# Patient Record
Sex: MALE
[De-identification: names, ages, dates, MRNs, and addresses within clinical notes are randomized per-mention and may not be internally consistent; named-entity substitution may affect disease eponyms.]

---

## 2020-06-06 ENCOUNTER — HOSPITAL ENCOUNTER (INPATIENT)
Dept: HOSPITAL 5 - ED | Age: 35
LOS: 7 days | Discharge: HOME HEALTH | DRG: 871 | End: 2020-06-13
Attending: INTERNAL MEDICINE | Admitting: INTERNAL MEDICINE
Payer: SELF-PAY

## 2020-06-06 DIAGNOSIS — J18.9: ICD-10-CM

## 2020-06-06 DIAGNOSIS — Z79.899: ICD-10-CM

## 2020-06-06 DIAGNOSIS — E83.51: ICD-10-CM

## 2020-06-06 DIAGNOSIS — K61.1: ICD-10-CM

## 2020-06-06 DIAGNOSIS — E43: ICD-10-CM

## 2020-06-06 DIAGNOSIS — F17.200: ICD-10-CM

## 2020-06-06 DIAGNOSIS — Z88.2: ICD-10-CM

## 2020-06-06 DIAGNOSIS — A41.9: Primary | ICD-10-CM

## 2020-06-06 DIAGNOSIS — N40.0: ICD-10-CM

## 2020-06-06 DIAGNOSIS — K61.0: ICD-10-CM

## 2020-06-06 DIAGNOSIS — Z20.828: ICD-10-CM

## 2020-06-06 DIAGNOSIS — Z79.4: ICD-10-CM

## 2020-06-06 DIAGNOSIS — E11.9: ICD-10-CM

## 2020-06-06 LAB
ALBUMIN SERPL-MCNC: 2.1 G/DL (ref 3.9–5)
ALT SERPL-CCNC: 9 UNITS/L (ref 7–56)
BAND NEUTROPHILS # (MANUAL): 0 K/MM3
BILIRUB UR QL STRIP: (no result)
BLOOD UR QL VISUAL: (no result)
BUN SERPL-MCNC: 6 MG/DL (ref 9–20)
BUN/CREAT SERPL: 10 %
CALCIUM SERPL-MCNC: 7.9 MG/DL (ref 8.4–10.2)
CRP SERPL-MCNC: 7.2 MG/DL (ref 0–1.3)
HCT VFR BLD CALC: 28 % (ref 35.5–45.6)
HEMOLYSIS INDEX: 15
HGB BLD-MCNC: 9.1 GM/DL (ref 11.8–15.2)
MCHC RBC AUTO-ENTMCNC: 33 % (ref 32–34)
MCV RBC AUTO: 89 FL (ref 84–94)
MYELOCYTES # (MANUAL): 0 K/MM3
PH UR STRIP: 8 [PH] (ref 5–7)
PLATELET # BLD: 227 K/MM3 (ref 140–440)
PROMYELOCYTES # (MANUAL): 0 K/MM3
PROT UR STRIP-MCNC: (no result) MG/DL
RBC # BLD AUTO: 3.16 M/MM3 (ref 3.65–5.03)
RBC #/AREA URNS HPF: 2 /HPF (ref 0–6)
TOTAL CELLS COUNTED BLD: 100
UROBILINOGEN UR-MCNC: < 2 MG/DL (ref ?–2)
WBC #/AREA URNS HPF: 1 /HPF (ref 0–6)

## 2020-06-06 PROCEDURE — 74177 CT ABD & PELVIS W/CONTRAST: CPT

## 2020-06-06 PROCEDURE — 87641 MR-STAPH DNA AMP PROBE: CPT

## 2020-06-06 PROCEDURE — 88305 TISSUE EXAM BY PATHOLOGIST: CPT

## 2020-06-06 PROCEDURE — 71045 X-RAY EXAM CHEST 1 VIEW: CPT

## 2020-06-06 PROCEDURE — 99406 BEHAV CHNG SMOKING 3-10 MIN: CPT

## 2020-06-06 PROCEDURE — 82140 ASSAY OF AMMONIA: CPT

## 2020-06-06 PROCEDURE — 82728 ASSAY OF FERRITIN: CPT

## 2020-06-06 PROCEDURE — 87040 BLOOD CULTURE FOR BACTERIA: CPT

## 2020-06-06 PROCEDURE — 81001 URINALYSIS AUTO W/SCOPE: CPT

## 2020-06-06 PROCEDURE — A4217 STERILE WATER/SALINE, 500 ML: HCPCS

## 2020-06-06 PROCEDURE — 82962 GLUCOSE BLOOD TEST: CPT

## 2020-06-06 PROCEDURE — 84145 PROCALCITONIN (PCT): CPT

## 2020-06-06 PROCEDURE — 36415 COLL VENOUS BLD VENIPUNCTURE: CPT

## 2020-06-06 PROCEDURE — 80053 COMPREHEN METABOLIC PANEL: CPT

## 2020-06-06 PROCEDURE — 83615 LACTATE (LD) (LDH) ENZYME: CPT

## 2020-06-06 PROCEDURE — 85379 FIBRIN DEGRADATION QUANT: CPT

## 2020-06-06 PROCEDURE — 85025 COMPLETE CBC W/AUTO DIFF WBC: CPT

## 2020-06-06 PROCEDURE — 82947 ASSAY GLUCOSE BLOOD QUANT: CPT

## 2020-06-06 PROCEDURE — 85007 BL SMEAR W/DIFF WBC COUNT: CPT

## 2020-06-06 PROCEDURE — 86140 C-REACTIVE PROTEIN: CPT

## 2020-06-06 PROCEDURE — A6250 SKIN SEAL PROTECT MOISTURIZR: HCPCS

## 2020-06-06 NOTE — HISTORY AND PHYSICAL REPORT
History of Present Illness


Date of examination: 06/06/20


Date of admission: 


06/06/20 18:42





Chief complaint: 





Fever 1 day 


History of present illness: 


Patient is 34 years old male with no significant past medical history except for

his recent admission to the hospital for perirectal abscess.  Patient was 

discharged from the hospital yesterday with a PICC line and an order for 

meropenem, Augmentin and doxycycline.  Patient brought to the emergency room 

from home for evaluation of fever and pain to the local area.  Patient had an 

I&D done by Dr. Chavez on June 1.  Patient denies any cough, shortness of 

breath, runny nose congestion.  Patient also denied any urinary symptoms.  

Sepsis protocol initiated and patient received a dose of meropenem, normal 

saline and, Tylenol and morphine.


Work up in ED revealed L medial pneumonia


Patient on appropriate abx.








- Past Medical History


BPH


IDDM








- Surgical History


Perirectal abscess --I  and D on June 1 2020





- Social History   


Smoking Status: Current Every Day Smoker





- 


Diabetes








- Medications


Home Medications: 


                                Home Medications











 Medication  Instructions  Recorded  Confirmed  Last Taken  Type


 


Balsalazide Disodium [Colazal] 750 mg PO TID 06/01/20 06/06/20 Unknown History


 


Insulin Detemir [Levemir VIAL] 25 unit SQ QHS 06/01/20 06/06/20 Unknown History


 


Insulin NPH/Regular [NovoLIN 70/30] 4 unit SUB-Q BID 06/01/20 06/06/20 Unknown 

History


 


OLANZapine [Zyprexa] 5 mg PO DAILY 06/01/20 06/06/20 Unknown History


 


Potassium Chloride [K-Dur] 10 meq PO BID 06/01/20 06/06/20 Unknown History


 


Tamsulosin [Flomax] 0.4 mg PO QDAY 06/01/20 06/06/20 Unknown History


 


ursodioL [Ursodiol] 300 mg PO BID 06/01/20 06/06/20 Unknown History


 


Amoxicillin/K Clav Tab [Augmentin 1 tab PO Q12HR #28 tab 06/02/20 06/06/20 

Unknown Rx





875 mg]     


 


Doxycycline Hyclate [Doxycycline 100 mg PO Q12HR #28 tab 06/02/20 06/06/20 

Unknown Rx





Hyclate TAB]     


 


Ibuprofen [Motrin 800 MG tab] 800 mg PO Q8HR PRN #20 tablet 06/02/20 06/06/20 

Unknown Rx


 


Acetaminophen [Acetaminophen TAB] 650 mg PO Q4H PRN  tablet 06/05/20 06/06/20 

Unknown Rx














 Review of Systems 


ROS: 


Stated complaint: FEVER


Other details as noted in HPI


Comment: All other systems reviewed and negative


Constitutional: chills, fever


Respiratory: denies: cough, orthopnea, shortness of breath, SOB with exertion, 

SOB at rest, wheezing


Cardiovascular: palpitations.  denies: chest pain


Gastrointestinal: denies: abdominal pain, nausea, vomiting


Neurological: denies: headache, weakness














Medications and Allergies


                                    Allergies











Allergy/AdvReac Type Severity Reaction Status Date / Time


 


Sulfa (Sulfonamide Allergy  Rash Verified 05/31/20 19:19





Antibiotics)     











                                Home Medications











 Medication  Instructions  Recorded  Confirmed  Last Taken  Type


 


Balsalazide Disodium [Colazal] 750 mg PO TID 06/01/20 06/06/20 Unknown History


 


Insulin Detemir [Levemir VIAL] 25 unit SQ QHS 06/01/20 06/06/20 Unknown History


 


Insulin NPH/Regular [NovoLIN 70/30] 4 unit SUB-Q BID 06/01/20 06/06/20 Unknown 

History


 


OLANZapine [Zyprexa] 5 mg PO DAILY 06/01/20 06/06/20 Unknown History


 


Potassium Chloride [K-Dur] 10 meq PO BID 06/01/20 06/06/20 Unknown History


 


Tamsulosin [Flomax] 0.4 mg PO QDAY 06/01/20 06/06/20 Unknown History


 


ursodioL [Ursodiol] 300 mg PO BID 06/01/20 06/06/20 Unknown History


 


Amoxicillin/K Clav Tab [Augmentin 1 tab PO Q12HR #28 tab 06/02/20 06/06/20 

Unknown Rx





875 mg]     


 


Doxycycline Hyclate [Doxycycline 100 mg PO Q12HR #28 tab 06/02/20 06/06/20 

Unknown Rx





Hyclate TAB]     


 


Ibuprofen [Motrin 800 MG tab] 800 mg PO Q8HR PRN #20 tablet 06/02/20 06/06/20 

Unknown Rx


 


Acetaminophen [Acetaminophen TAB] 650 mg PO Q4H PRN  tablet 06/05/20 06/06/20 

Unknown Rx














Exam





- Constitutional


Vitals: 


                                        











Temp Pulse Resp BP Pulse Ox


 


 98.2 F   66   18   86/51   95 


 


 06/06/20 21:45  06/06/20 21:45  06/06/20 21:45  06/06/20 21:45  06/06/20 21:45











General appearance: Present: no acute distress, well-nourished





- EENT


Eyes: Present: PERRL


ENT: hearing intact, clear oral mucosa





- Neck


Neck: Present: supple, normal ROM





- Respiratory


Respiratory effort: normal


Respiratory: bilateral: CTA





- Cardiovascular


Heart rate: 78


Rhythm: regular


Heart Sounds: Present: S1 & S2.  Absent: rub, click





- Extremities


Extremities: no ischemia, pulses intact, pulses symmetrical, No edema


Peripheral Pulses: within normal limits





- Abdominal


General gastrointestinal: Present: soft, non-tender, non-distended, normal bowel

sounds


Male genitourinary: Present: normal





- Rectal


Rectal Exam: deferred





- Integumentary


Integumentary: Present: clear, warm, dry





- Musculoskeletal


Musculoskeletal: gait normal, strength equal bilaterally





- Psychiatric


Psychiatric: appropriate mood/affect, intact judgment & insight





- Neurologic


Neurologic: CNII-XII intact, moves all extremities





- Allied Health


Allied health notes reviewed: nursing, case management





Results





- Labs


CBC & Chem 7: 


                                 06/06/20 17:58





                                 06/06/20 18:58


Labs: 


                             Laboratory Last Values











WBC  4.7 K/mm3 (4.5-11.0)   06/06/20  17:58    


 


RBC  3.16 M/mm3 (3.65-5.03)  L  06/06/20  17:58    


 


Hgb  9.1 gm/dl (11.8-15.2)  L  06/06/20  17:58    


 


Hct  28.0 % (35.5-45.6)  L  06/06/20  17:58    


 


MCV  89 fl (84-94)   06/06/20  17:58    


 


MCH  29 pg (28-32)   06/06/20  17:58    


 


MCHC  33 % (32-34)   06/06/20  17:58    


 


RDW  17.7 % (13.2-15.2)  H  06/06/20  17:58    


 


Plt Count  227 K/mm3 (140-440)   06/06/20  17:58    


 


Mono % (Auto)  Np   06/06/20  17:58    


 


Add Manual Diff  Complete   06/06/20  17:58    


 


Total Counted  100   06/06/20  17:58    


 


Seg Neuts % (Manual)  67.0 % (40.0-70.0)   06/06/20  17:58    


 


Band Neutrophils %  0 %  06/06/20  17:58    


 


Lymphocytes % (Manual)  21.0 % (13.4-35.0)   06/06/20  17:58    


 


Reactive Lymphs % (Man)  0 %  06/06/20  17:58    


 


Monocytes % (Manual)  11.0 % (0.0-7.3)  H  06/06/20  17:58    


 


Eosinophils % (Manual)  1.0 % (0.0-4.3)   06/06/20  17:58    


 


Basophils % (Manual)  0 % (0.0-1.8)   06/06/20  17:58    


 


Metamyelocytes %  0 %  06/06/20  17:58    


 


Myelocytes %  0 %  06/06/20  17:58    


 


Promyelocytes %  0 %  06/06/20  17:58    


 


Blast Cells %  0 %  06/06/20  17:58    


 


Nucleated RBC %  Not Reportable   06/06/20  17:58    


 


Seg Neutrophils # Man  3.1 K/mm3 (1.8-7.7)   06/06/20  17:58    


 


Band Neutrophils #  0.0 K/mm3  06/06/20  17:58    


 


Lymphocytes # (Manual)  1.0 K/mm3 (1.2-5.4)  L  06/06/20  17:58    


 


Abs React Lymphs (Man)  0.0 K/mm3  06/06/20  17:58    


 


Monocytes # (Manual)  0.5 K/mm3 (0.0-0.8)   06/06/20  17:58    


 


Eosinophils # (Manual)  0.0 K/mm3 (0.0-0.4)   06/06/20  17:58    


 


Basophils # (Manual)  0.0 K/mm3 (0.0-0.1)   06/06/20  17:58    


 


Metamyelocytes #  0.0 K/mm3  06/06/20  17:58    


 


Myelocytes #  0.0 K/mm3  06/06/20  17:58    


 


Promyelocytes #  0.0 K/mm3  06/06/20  17:58    


 


Blast Cells #  0.0 K/mm3  06/06/20  17:58    


 


WBC Morphology  Not Reportable   06/06/20  17:58    


 


Hypersegmented Neuts  Not Reportable   06/06/20  17:58    


 


Hyposegmented Neuts  Not Reportable   06/06/20  17:58    


 


Hypogranular Neuts  Not Reportable   06/06/20  17:58    


 


Smudge Cells  Not Reportable   06/06/20  17:58    


 


Toxic Granulation  Not Reportable   06/06/20  17:58    


 


Toxic Vacuolation  Not Reportable   06/06/20  17:58    


 


Dohle Bodies  Not Reportable   06/06/20  17:58    


 


Pelger-Huet Anomaly  Not Reportable   06/06/20  17:58    


 


Tammy Rods  Not Reportable   06/06/20  17:58    


 


Platelet Estimate  Consistent w auto   06/06/20  17:58    


 


Clumped Platelets  Not Reportable   06/06/20  17:58    


 


Plt Clumps, EDTA  Not Reportable   06/06/20  17:58    


 


Large Platelets  Not Reportable   06/06/20  17:58    


 


Giant Platelets  Not Reportable   06/06/20  17:58    


 


Platelet Satelliting  Not Reportable   06/06/20  17:58    


 


Plt Morphology Comment  Not Reportable   06/06/20  17:58    


 


RBC Morphology  Not Reportable   06/06/20  17:58    


 


Dimorphic RBCs  Not Reportable   06/06/20  17:58    


 


Polychromasia  Few   06/06/20  17:58    


 


Hypochromasia  Not Reportable   06/06/20  17:58    


 


Poikilocytosis  Not Reportable   06/06/20  17:58    


 


Anisocytosis  Not Reportable   06/06/20  17:58    


 


Microcytosis  1+   06/06/20  17:58    


 


Macrocytosis  Not Reportable   06/06/20  17:58    


 


Spherocytes  Not Reportable   06/06/20  17:58    


 


Pappenheimer Bodies  Not Reportable   06/06/20  17:58    


 


Sickle Cells  Not Reportable   06/06/20  17:58    


 


Target Cells  Not Reportable   06/06/20  17:58    


 


Tear Drop Cells  Not Reportable   06/06/20  17:58    


 


Ovalocytes  Not Reportable   06/06/20  17:58    


 


Stomatocytes  Few   06/06/20  17:58    


 


Helmet Cells  Not Reportable   06/06/20  17:58    


 


[place] Bodies  Not Reportable   06/06/20  17:58    


 


Cabot Rings  Not Reportable   06/06/20  17:58    


 


[place] Cells  Not Reportable   06/06/20  17:58    


 


Bite Cells  Not Reportable   06/06/20  17:58    


 


Crenated Cell  Not Reportable   06/06/20  17:58    


 


Elliptocytes  Not Reportable   06/06/20  17:58    


 


Acanthocytes (Spur)  Not Reportable   06/06/20  17:58    


 


Rouleaux  Not Reportable   06/06/20  17:58    


 


Hemoglobin C Crystals  Not Reportable   06/06/20  17:58    


 


Schistocytes  Not Reportable   06/06/20  17:58    


 


Malaria parasites  Not Reportable   06/06/20  17:58    


 


[place] Bodies  Not Reportable   06/06/20  17:58    


 


Hem Pathologist Commnt  No   06/06/20  17:58    


 


D-Dimer  597.75 ng/mlDDU (0-234)  H  06/06/20  18:58    


 


Sodium  139 mmol/L (137-145)   06/06/20  17:58    


 


Potassium  3.7 mmol/L (3.6-5.0)   06/06/20  17:58    


 


Chloride  102.0 mmol/L ()   06/06/20  17:58    


 


Carbon Dioxide  25 mmol/L (22-30)   06/06/20  17:58    


 


Anion Gap  16 mmol/L  06/06/20  17:58    


 


BUN  6 mg/dL (9-20)  L  06/06/20  17:58    


 


Creatinine  0.6 mg/dL (0.8-1.5)  L  06/06/20  17:58    


 


Estimated GFR  > 60 ml/min  06/06/20  17:58    


 


BUN/Creatinine Ratio  10 %  06/06/20  17:58    


 


Glucose  134 mg/dL ()  H  06/06/20  18:58    


 


Lactic Acid  1.10 mmol/L (0.7-2.0)   06/06/20  17:58    


 


Calcium  7.9 mg/dL (8.4-10.2)  L  06/06/20  17:58    


 


Ferritin  80.4 ng/mL (13.0-400.0)   06/06/20  18:58    


 


Total Bilirubin  0.30 mg/dL (0.1-1.2)   06/06/20  17:58    


 


AST  10 units/L (5-40)   06/06/20  17:58    


 


ALT  9 units/L (7-56)   06/06/20  17:58    


 


Alkaline Phosphatase  323 units/L ()  H  06/06/20  17:58    


 


Lactate Dehydrogenase  155 units/L ()   06/06/20  18:58    


 


C-Reactive Protein  7.20 mg/dL (0.00-1.30)  H  06/06/20  18:58    


 


Total Protein  5.9 g/dL (6.3-8.2)  L  06/06/20  17:58    


 


Albumin  2.1 g/dL (3.9-5)  L  06/06/20  17:58    


 


Albumin/Globulin Ratio  0.6 %  06/06/20  17:58    


 


Urine Color  Straw  (Yellow)   06/06/20  17:31    


 


Urine Turbidity  Clear  (Clear)   06/06/20  17:31    


 


Urine pH  8.0  (5.0-7.0)  H  06/06/20  17:31    


 


Ur Specific Gravity  1.006  (1.003-1.030)   06/06/20  17:31    


 


Urine Protein  <15 mg/dl mg/dL (Negative)   06/06/20  17:31    


 


Urine Glucose (UA)  >=500 mg/dL (Negative)   06/06/20  17:31    


 


Urine Ketones  Neg mg/dL (Negative)   06/06/20  17:31    


 


Urine Blood  Neg  (Negative)   06/06/20  17:31    


 


Urine Nitrite  Neg  (Negative)   06/06/20  17:31    


 


Urine Bilirubin  Neg  (Negative)   06/06/20  17:31    


 


Urine Urobilinogen  < 2.0 mg/dL (<2.0)   06/06/20  17:31    


 


Ur Leukocyte Esterase  Neg  (Negative)   06/06/20  17:31    


 


Urine WBC (Auto)  1.0 /HPF (0.0-6.0)   06/06/20  17:31    


 


Urine RBC (Auto)  2.0 /HPF (0.0-6.0)   06/06/20  17:31    








                                    Short CBC











  06/06/20 Range/Units





  17:58 


 


WBC  4.7  (4.5-11.0)  K/mm3


 


Hgb  9.1 L  (11.8-15.2)  gm/dl


 


Hct  28.0 L  (35.5-45.6)  %


 


Plt Count  227  (140-440)  K/mm3








                                       BMP











  06/06/20 06/06/20





  17:58 18:58


 


Sodium  139 


 


Potassium  3.7 


 


Chloride  102.0 


 


Carbon Dioxide  25 


 


BUN  6 L 


 


Creatinine  0.6 L 


 


Glucose  156 H  134 H


 


Calcium  7.9 L 








                                 Liver Function











  06/06/20 Range/Units





  17:58 


 


Total Bilirubin  0.30  (0.1-1.2)  mg/dL


 


AST  10  (5-40)  units/L


 


ALT  9  (7-56)  units/L


 


Alkaline Phosphatase  323 H  ()  units/L


 


Albumin  2.1 L  (3.9-5)  g/dL








                                      Urine











  06/06/20 Range/Units





  17:31 


 


Urine Color  Straw  (Yellow)  


 


Urine pH  8.0 H  (5.0-7.0)  


 


Ur Specific Gravity  1.006  (1.003-1.030)  


 


Urine Protein  <15 mg/dl  (Negative)  mg/dL


 


Urine Glucose (UA)  >=500  (Negative)  mg/dL











Microbiology: 


Microbiology





06/06/20 17:58   Peripheral/Venous   Blood Culture - Preliminary


                            Culture in Progress


06/06/20 17:58   Peripheral/Venous   Blood Culture - Preliminary


                            Culture in Progress











- Imaging and Cardiology


Chest x-ray: report reviewed (LLL pneumonia)





Assessment and Plan


Advance Directives: Yes (Full code)


VTE prophylaxis?: Chemical





- Patient Problems


(1) Left lower lobe pneumonia


Current Visit: Yes   Status: Acute   


Plan to address problem: 


Patient on appropriate abx


COnt Meropenem.


Reason for admitting is rule out COvid reated pna


If Corona virus negative --Patient maybe discharged on same abx


Isolation tillo then








(2) Perirectal abscess


Current Visit: Yes   Status: Acute   


Plan to address problem: 


Cont Meropenem and Augmentin








(3) IDDM (insulin dependent diabetes mellitus)


Current Visit: Yes   Status: Chronic   


Plan to address problem: 


Recent last A1c 7.1 a week ago


Cont Home Insulin and coverage








(4) BPH (benign prostatic hyperplasia)


Current Visit: Yes   Status: Chronic   


Qualifiers: 


   Lower urinary tract symptom presence: symptoms present 


Plan to address problem: 


Cont Flomax








(5) Malnutrition


Current Visit: Yes   Status: Acute   


Qualifiers: 


   Protein-calorie malnutrition severity: severe 


Plan to address problem: 


Albumin 2.1


Dietitian consult


Dietary supplements ordered








(6) Hypocalcemia


Current Visit: Yes   Status: Acute   


Plan to address problem: 


Supplemented








(7) DVT prophylaxis


Current Visit: No   Status: Acute   


Plan to address problem: 


On Lovenox and GI prophylaxis

## 2020-06-06 NOTE — EMERGENCY DEPARTMENT REPORT
ED General Adult HPI





- General


Chief complaint: Fever


Stated complaint: FEVER


Time Seen by Provider: 06/06/20 17:22


Source: patient


Mode of arrival: Stretcher


Limitations: Physical Limitation





- History of Present Illness


Initial comments: 





Patient is 34 years old male with no significant past medical history except for

his recent admission to the hospital for perirectal abscess.  Patient was 

discharged from the hospital yesterday with a PICC line and an order for 

meropenem, Augmentin and doxycycline.  Patient brought to the emergency room 

from home for evaluation of fever and pain to the local area.  Patient had an 

I&D done by Dr. Chavez on June 1.  Patient denies any cough, shortness of 

breath, runny nose congestion.  Patient also denied any urinary symptoms.  

Sepsis protocol initiated and patient received a dose of meropenem, normal 

saline and, Tylenol and morphine.


Severity scale (0 -10): 7





- Related Data


                                Home Medications











 Medication  Instructions  Recorded  Confirmed  Last Taken


 


Balsalazide Disodium [Colazal] 750 mg PO TID 06/01/20 06/06/20 Unknown


 


Insulin Detemir [Levemir VIAL] 25 unit SQ QHS 06/01/20 06/06/20 Unknown


 


Insulin NPH/Regular [NovoLIN 70/30] 4 unit SUB-Q BID 06/01/20 06/06/20 Unknown


 


OLANZapine [Zyprexa] 5 mg PO DAILY 06/01/20 06/06/20 Unknown


 


Potassium Chloride [K-Dur] 10 meq PO BID 06/01/20 06/06/20 Unknown


 


Tamsulosin [Flomax] 0.4 mg PO QDAY 06/01/20 06/06/20 Unknown


 


ursodioL [Ursodiol] 300 mg PO BID 06/01/20 06/06/20 Unknown








                                  Previous Rx's











 Medication  Instructions  Recorded  Last Taken  Type


 


Amoxicillin/K Clav Tab [Augmentin 1 tab PO Q12HR #28 tab 06/02/20 Unknown Rx





875 mg]    


 


Doxycycline Hyclate [Doxycycline 100 mg PO Q12HR #28 tab 06/02/20 Unknown Rx





Hyclate TAB]    


 


Ibuprofen [Motrin 800 MG tab] 800 mg PO Q8HR PRN #20 tablet 06/02/20 Unknown Rx


 


Acetaminophen [Acetaminophen TAB] 650 mg PO Q4H PRN  tablet 06/05/20 Unknown Rx











                                    Allergies











Allergy/AdvReac Type Severity Reaction Status Date / Time


 


Sulfa (Sulfonamide Allergy  Rash Verified 05/31/20 19:19





Antibiotics)     














ED Review of Systems


ROS: 


Stated complaint: FEVER


Other details as noted in HPI





Comment: All other systems reviewed and negative


Constitutional: chills, fever


Respiratory: denies: cough, orthopnea, shortness of breath, SOB with exertion, 

SOB at rest, wheezing


Cardiovascular: palpitations.  denies: chest pain


Gastrointestinal: denies: abdominal pain, nausea, vomiting


Neurological: denies: headache, weakness





ED Past Medical Hx





- Past Medical History


Hx Hypertension: No


Hx Heart Attack/AMI: No


Hx Congestive Heart Failure: No


Hx Diabetes: Yes


Hx Deep Vein Thrombosis: No


Hx Pulmonary Embolism: No


Hx Liver Disease: No


Hx Renal Disease: No


Hx Sickle Cell Disease: No


Hx Arthritis: No


Hx Seizures: No


Hx Asthma: No


Hx COPD: No


Hx HIV: No





- Surgical History


Hx Pacemaker: No


Hx Internal Defibrillator: No


Hx Cholecystectomy: No


Hx Appendectomy: No





- Social History


Smoking Status: Current Every Day Smoker





- Medications


Home Medications: 


                                Home Medications











 Medication  Instructions  Recorded  Confirmed  Last Taken  Type


 


Balsalazide Disodium [Colazal] 750 mg PO TID 06/01/20 06/06/20 Unknown History


 


Insulin Detemir [Levemir VIAL] 25 unit SQ QHS 06/01/20 06/06/20 Unknown History


 


Insulin NPH/Regular [NovoLIN 70/30] 4 unit SUB-Q BID 06/01/20 06/06/20 Unknown 

History


 


OLANZapine [Zyprexa] 5 mg PO DAILY 06/01/20 06/06/20 Unknown History


 


Potassium Chloride [K-Dur] 10 meq PO BID 06/01/20 06/06/20 Unknown History


 


Tamsulosin [Flomax] 0.4 mg PO QDAY 06/01/20 06/06/20 Unknown History


 


ursodioL [Ursodiol] 300 mg PO BID 06/01/20 06/06/20 Unknown History


 


Amoxicillin/K Clav Tab [Augmentin 1 tab PO Q12HR #28 tab 06/02/20 06/06/20 

Unknown Rx





875 mg]     


 


Doxycycline Hyclate [Doxycycline 100 mg PO Q12HR #28 tab 06/02/20 06/06/20 

Unknown Rx





Hyclate TAB]     


 


Ibuprofen [Motrin 800 MG tab] 800 mg PO Q8HR PRN #20 tablet 06/02/20 06/06/20 

Unknown Rx


 


Acetaminophen [Acetaminophen TAB] 650 mg PO Q4H PRN  tablet 06/05/20 06/06/20 

Unknown Rx














ED Physical Exam





- General


Limitations: Physical Limitation


General appearance: alert, in no apparent distress





- Head


Head exam: Present: atraumatic, normocephalic, normal inspection





- ENT


ENT exam: Present: mucous membranes dry





- Neck


Neck exam: Present: normal inspection, full ROM.  Absent: tenderness, 

meningismus





- Respiratory


Respiratory exam: Present: normal lung sounds bilaterally





- Cardiovascular


Cardiovascular Exam: Present: tachycardia





- GI/Abdominal


GI/Abdominal exam: Present: soft, normal bowel sounds.  Absent: distended, 

tenderness, guarding, rebound, rigid, organomegaly, mass, bruit, pulsatile mass,

hernia





- Rectal


Rectal exam: Present: other (I&D with a fistula track.  Stool coming through the

fistula.)





- Extremities Exam


Extremities exam: Present: normal inspection, full ROM, normal capillary refill





- Neurological Exam


Neurological exam: Present: alert, oriented X3, CN II-XII intact





- Skin


Skin exam: Present: warm





ED Course


                                   Vital Signs











  06/06/20 06/06/20 06/06/20





  17:27 18:15 18:31


 


Temperature 103.2 F H  


 


Pulse Rate 125 H 107 H 106 H


 


Respiratory 12 19 23





Rate   


 


Blood Pressure  112/69 112/69


 


Blood Pressure 127/83  





[Right]   


 


O2 Sat by Pulse 97 97 96





Oximetry   














  06/06/20 06/06/20 06/06/20





  19:01 19:15 19:31


 


Temperature   


 


Pulse Rate 110 H 98 H 102 H


 


Respiratory 18 14 13





Rate   


 


Blood Pressure 112/69 101/61 101/61


 


Blood Pressure   





[Right]   


 


O2 Sat by Pulse 96 97 95





Oximetry   














  06/06/20 06/06/20 06/06/20





  19:45 20:01 21:01


 


Temperature   


 


Pulse Rate 109 H 99 H 83


 


Respiratory 21 15 15





Rate   


 


Blood Pressure 101/61 101/61 96/56


 


Blood Pressure   





[Right]   


 


O2 Sat by Pulse 93 94 97





Oximetry   














  06/06/20 06/06/20 06/06/20





  21:14 21:21 21:45


 


Temperature 99.8 F H  98.2 F


 


Pulse Rate  76 66


 


Respiratory  16 18





Rate   


 


Blood Pressure  92/55 86/51


 


Blood Pressure   





[Right]   


 


O2 Sat by Pulse  97 95





Oximetry   














ED Medical Decision Making





- Lab Data


Result diagrams: 


                                 06/07/20 05:58





                                 06/07/20 05:58





- Radiology Data


Radiology results: report reviewed





- Medical Decision Making





Patient is 34 years old male with no significant past medical history except for

 his recent admission to the hospital for perirectal abscess.  Patient was 

discharged from the hospital yesterday with a PICC line and an order for 

meropenem, Augmentin and doxycycline.  Patient brought to the emergency room 

from home for evaluation of fever and pain to the local area.  Patient had an 

I&D done by Dr. Chavez on June 1.  Patient denies any cough, shortness of 

breath, runny nose congestion.  Patient also denied any urinary symptoms.  

Sepsis protocol initiated and patient received a dose of meropenem, normal 

saline and, Tylenol and morphine.





Chest x-ray showed a left lower lobe pneumonia.  Most likely hospital-acquired 

pneumonia.  Patient started on Levaquin.  I discussed the patient with Dr. Pacheco who is covering for Dr. Gómez, he agreed to admit the patient to the 

hospital for further management.


Critical Care Time: Yes


Critical care time in (mins) excluding proc time.: 30


Critical care attestation.: 


If time is entered above; I have spent that time in minutes in the direct care 

of this critically ill patient, excluding procedure time.








ED Disposition


Clinical Impression: 


 Perirectal abscess, Left lower lobe pneumonia, Fever, Sepsis





Disposition: [id number] OP ADMIT IP TO THIS HOSP


Is pt being admited?: Yes


Condition: Stable

## 2020-06-07 LAB
ALBUMIN SERPL-MCNC: 2.3 G/DL (ref 3.9–5)
ALT SERPL-CCNC: 8 UNITS/L (ref 7–56)
BASOPHILS # (AUTO): 0 K/MM3 (ref 0–0.1)
BASOPHILS NFR BLD AUTO: 0.2 % (ref 0–1.8)
BUN SERPL-MCNC: 5 MG/DL (ref 9–20)
BUN/CREAT SERPL: 7 %
CALCIUM SERPL-MCNC: 8.1 MG/DL (ref 8.4–10.2)
EOSINOPHIL # BLD AUTO: 0 K/MM3 (ref 0–0.4)
EOSINOPHIL NFR BLD AUTO: 0.6 % (ref 0–4.3)
HCT VFR BLD CALC: 29.2 % (ref 35.5–45.6)
HEMOLYSIS INDEX: 3
HGB BLD-MCNC: 9.4 GM/DL (ref 11.8–15.2)
LYMPHOCYTES # BLD AUTO: 1.4 K/MM3 (ref 1.2–5.4)
LYMPHOCYTES NFR BLD AUTO: 28 % (ref 13.4–35)
MCHC RBC AUTO-ENTMCNC: 32 % (ref 32–34)
MCV RBC AUTO: 89 FL (ref 84–94)
MONOCYTES # (AUTO): 0.8 K/MM3 (ref 0–0.8)
MONOCYTES % (AUTO): 15.7 % (ref 0–7.3)
PLATELET # BLD: 257 K/MM3 (ref 140–440)
RBC # BLD AUTO: 3.28 M/MM3 (ref 3.65–5.03)

## 2020-06-07 RX ADMIN — TAMSULOSIN HYDROCHLORIDE SCH MG: 0.4 CAPSULE ORAL at 09:47

## 2020-06-07 RX ADMIN — AMOXICILLIN AND CLAVULANATE POTASSIUM SCH EACH: 875; 125 TABLET, FILM COATED ORAL at 09:47

## 2020-06-07 RX ADMIN — HYDROMORPHONE HYDROCHLORIDE PRN MG: 1 INJECTION, SOLUTION INTRAMUSCULAR; INTRAVENOUS; SUBCUTANEOUS at 20:50

## 2020-06-07 RX ADMIN — DOXYCYCLINE HYCLATE SCH MG: 100 CAPSULE ORAL at 09:47

## 2020-06-07 RX ADMIN — Medication SCH ML: at 22:17

## 2020-06-07 RX ADMIN — INSULIN HUMAN SCH UNIT: 100 INJECTION, SUSPENSION SUBCUTANEOUS at 00:40

## 2020-06-07 RX ADMIN — INSULIN HUMAN SCH: 100 INJECTION, SUSPENSION SUBCUTANEOUS at 08:32

## 2020-06-07 RX ADMIN — AMOXICILLIN AND CLAVULANATE POTASSIUM SCH EACH: 875; 125 TABLET, FILM COATED ORAL at 00:40

## 2020-06-07 RX ADMIN — POTASSIUM CHLORIDE SCH MEQ: 1500 TABLET, EXTENDED RELEASE ORAL at 00:41

## 2020-06-07 RX ADMIN — MEROPENEM SCH MLS/HR: 500 INJECTION, POWDER, FOR SOLUTION INTRAVENOUS at 12:23

## 2020-06-07 RX ADMIN — DOXYCYCLINE HYCLATE SCH MG: 100 CAPSULE ORAL at 22:09

## 2020-06-07 RX ADMIN — OXYCODONE AND ACETAMINOPHEN PRN TAB: 5; 325 TABLET ORAL at 12:23

## 2020-06-07 RX ADMIN — Medication SCH EACH: at 22:09

## 2020-06-07 RX ADMIN — DOXYCYCLINE HYCLATE SCH MG: 100 CAPSULE ORAL at 00:41

## 2020-06-07 RX ADMIN — MEROPENEM SCH MLS/HR: 500 INJECTION, POWDER, FOR SOLUTION INTRAVENOUS at 05:50

## 2020-06-07 RX ADMIN — OXYCODONE AND ACETAMINOPHEN PRN TAB: 5; 325 TABLET ORAL at 01:01

## 2020-06-07 RX ADMIN — INSULIN HUMAN SCH UNIT: 100 INJECTION, SUSPENSION SUBCUTANEOUS at 12:23

## 2020-06-07 RX ADMIN — POTASSIUM CHLORIDE SCH MEQ: 1500 TABLET, EXTENDED RELEASE ORAL at 22:09

## 2020-06-07 RX ADMIN — INSULIN HUMAN SCH UNIT: 100 INJECTION, SUSPENSION SUBCUTANEOUS at 17:29

## 2020-06-07 RX ADMIN — FAMOTIDINE SCH MG: 20 TABLET ORAL at 22:09

## 2020-06-07 RX ADMIN — POTASSIUM CHLORIDE SCH MEQ: 1500 TABLET, EXTENDED RELEASE ORAL at 09:47

## 2020-06-07 RX ADMIN — HYDROMORPHONE HYDROCHLORIDE PRN MG: 1 INJECTION, SOLUTION INTRAMUSCULAR; INTRAVENOUS; SUBCUTANEOUS at 15:56

## 2020-06-07 RX ADMIN — HYDROMORPHONE HYDROCHLORIDE PRN MG: 1 INJECTION, SOLUTION INTRAMUSCULAR; INTRAVENOUS; SUBCUTANEOUS at 09:53

## 2020-06-07 RX ADMIN — MEROPENEM SCH MLS/HR: 500 INJECTION, POWDER, FOR SOLUTION INTRAVENOUS at 00:39

## 2020-06-07 RX ADMIN — AMOXICILLIN AND CLAVULANATE POTASSIUM SCH EACH: 875; 125 TABLET, FILM COATED ORAL at 22:09

## 2020-06-07 RX ADMIN — Medication SCH ML: at 09:46

## 2020-06-07 RX ADMIN — SODIUM CHLORIDE SCH MLS/HR: 0.9 INJECTION, SOLUTION INTRAVENOUS at 00:39

## 2020-06-07 RX ADMIN — IBUPROFEN PRN MG: 800 TABLET, FILM COATED ORAL at 05:50

## 2020-06-07 RX ADMIN — SODIUM CHLORIDE SCH MLS/HR: 0.9 INJECTION, SOLUTION INTRAVENOUS at 09:46

## 2020-06-07 RX ADMIN — Medication SCH EACH: at 09:47

## 2020-06-07 RX ADMIN — MEROPENEM SCH MLS/HR: 500 INJECTION, POWDER, FOR SOLUTION INTRAVENOUS at 17:27

## 2020-06-08 RX ADMIN — Medication SCH: at 09:09

## 2020-06-08 RX ADMIN — OXYCODONE AND ACETAMINOPHEN PRN TAB: 5; 325 TABLET ORAL at 08:08

## 2020-06-08 RX ADMIN — IBUPROFEN PRN MG: 800 TABLET, FILM COATED ORAL at 14:17

## 2020-06-08 RX ADMIN — Medication SCH EACH: at 22:47

## 2020-06-08 RX ADMIN — POTASSIUM CHLORIDE SCH MEQ: 1500 TABLET, EXTENDED RELEASE ORAL at 09:08

## 2020-06-08 RX ADMIN — OXYCODONE AND ACETAMINOPHEN PRN TAB: 5; 325 TABLET ORAL at 17:35

## 2020-06-08 RX ADMIN — OXYCODONE AND ACETAMINOPHEN PRN TAB: 5; 325 TABLET ORAL at 22:46

## 2020-06-08 RX ADMIN — INSULIN HUMAN SCH UNIT: 100 INJECTION, SUSPENSION SUBCUTANEOUS at 17:30

## 2020-06-08 RX ADMIN — DOXYCYCLINE HYCLATE SCH MG: 100 CAPSULE ORAL at 09:08

## 2020-06-08 RX ADMIN — AMOXICILLIN AND CLAVULANATE POTASSIUM SCH EACH: 875; 125 TABLET, FILM COATED ORAL at 09:08

## 2020-06-08 RX ADMIN — MEROPENEM SCH MLS/HR: 500 INJECTION, POWDER, FOR SOLUTION INTRAVENOUS at 05:37

## 2020-06-08 RX ADMIN — Medication SCH EACH: at 09:08

## 2020-06-08 RX ADMIN — FAMOTIDINE SCH MG: 20 TABLET ORAL at 09:08

## 2020-06-08 RX ADMIN — FAMOTIDINE SCH MG: 20 TABLET ORAL at 23:07

## 2020-06-08 RX ADMIN — MEROPENEM SCH MLS/HR: 500 INJECTION, POWDER, FOR SOLUTION INTRAVENOUS at 00:11

## 2020-06-08 RX ADMIN — INSULIN HUMAN SCH UNIT: 100 INJECTION, SUSPENSION SUBCUTANEOUS at 09:08

## 2020-06-08 RX ADMIN — POTASSIUM CHLORIDE SCH MEQ: 1500 TABLET, EXTENDED RELEASE ORAL at 22:46

## 2020-06-08 RX ADMIN — TAMSULOSIN HYDROCHLORIDE SCH MG: 0.4 CAPSULE ORAL at 09:09

## 2020-06-08 RX ADMIN — MEROPENEM SCH MLS/HR: 500 INJECTION, POWDER, FOR SOLUTION INTRAVENOUS at 17:30

## 2020-06-08 RX ADMIN — MEROPENEM SCH MLS/HR: 500 INJECTION, POWDER, FOR SOLUTION INTRAVENOUS at 11:02

## 2020-06-08 RX ADMIN — Medication SCH ML: at 22:47

## 2020-06-08 NOTE — PROGRESS NOTE
Assessment and Plan





- Patient Problems


(1) Fever


Current Visit: Yes   Status: Acute   


Plan to address problem: 


Patient fever exact etiology unknown.  Most likely secondary to pneumonia.  

Patient on meropenem for the perirectal abscess.  Was in the hospital afebrile 

for 72 hours.  Patient is discharged the next day presented with fever and 

pneumonia.  Most likely hospital-acquired pneumonia.  Will await ID 

recommendations for possibilities of addition of antibiotic if needed for 

hospital-acquired pneumonia.  [place] patient discharge back to the facility with 

continued treatment.  Patient is COVID negative.  Has IV antibiotics set up in 

the home already.  No respiratory symptoms hemodynamically stable.








(2) Left lower lobe pneumonia


Current Visit: Yes   Status: Acute   


Plan to address problem: 


As mentioned above I think this can be treated as outpatient.  Will need to 

determine whether additional antibiotic coverage is required for community-

acquired pneumonia.








(3) Malnutrition


Current Visit: Yes   Status: Acute   


Qualifiers: 


   Protein-calorie malnutrition severity: severe 


Plan to address problem: 


Patient now eating much better.  Appetite is picked up no longer malnourished.








(4) Perirectal abscess


Current Visit: Yes   Status: Acute   


Plan to address problem: 


Perirectal abscess continue meropenem and we will do on outpatient basis.








(5) IDDM (insulin dependent diabetes mellitus)


Current Visit: Yes   Status: Chronic   


Plan to address problem: 


We will add patient's insulin back.  Cover with sliding scale insulin.








Subjective


Date of service: 06/08/20


Principal diagnosis: Pneumonia


Interval history: 





Patient 34 years old just here and discharge for perirectal abscess.  Patient 

was doing well has been afebrile greater than 72 hours prior to discharge.  

Patient was discharged on IV antibiotics meropenem and presented a day later 

with a fever of 103 and diagnosed with left lower lobe pneumonia.  Patient had 

fever yesterday 101.5.  Patient does have minimal symptoms of mild cough 

nonproductive.





Objective





- Constitutional


Vitals: 


                               Vital Signs - 12hr











  06/07/20 06/07/20 06/07/20





  20:50 21:20 21:36


 


Temperature   98.5 F


 


Pulse Rate   


 


Respiratory 18 18 18





Rate   


 


Blood Pressure   94/52


 


O2 Sat by Pulse   





Oximetry   














  06/07/20 06/08/20





  22:00 04:32


 


Temperature  99.8 F H


 


Pulse Rate  92 H


 


Respiratory  16





Rate  


 


Blood Pressure  95/53


 


O2 Sat by Pulse 96 95





Oximetry  











General appearance: Present: no acute distress, well-nourished





- EENT


Eyes: PERRL, EOM intact


ENT: hearing intact, clear oral mucosa


Ears: bilateral: normal





- Neck


Neck: supple, normal ROM





- Respiratory


Respiratory effort: normal


Respiratory: bilateral: CTA





- Breasts


Breasts: normal





- Cardiovascular


Rhythm: regular


Heart Sounds: Present: S1 & S2.  Absent: gallop, rub


Extremities: pulses intact, No edema, normal color, Full ROM


Extremity abnormal: other (Perirectal abscess bandage local wound care.)





- Gastrointestinal


General gastrointestinal: Present: soft, non-tender, non-distended, normal bowel

sounds





- Genitourinary


Male genitourinary: normal





- Integumentary


Integumentary: clear, warm, dry





- Musculoskeletal


Musculoskeletal: 1, strength equal bilaterally





- Neurologic


Neurologic: moves all extremities





- Psychiatric


Psychiatric: memory intact, appropriate mood/affect, intact judgment & insight





- Labs


CBC & Chem 7: 


                                 06/07/20 05:58





                                 06/07/20 05:58


Labs: 


                              Abnormal lab results











  06/07/20 06/07/20 06/08/20 Range/Units





  11:29 16:27 07:31 


 


POC Glucose  291 H  326 H  145 H  ()

## 2020-06-08 NOTE — CONSULTATION
History of Present Illness





- Reason for Consult


Consult date: 06/08/20





- History of Present Illness





35 yo M PMhx perirectal abscess presents to the hospital due to fever and pain 

one day after being discharged for his perirectal abscess. During the previous 

admission the abscess was I&D'd by Dr. meyer, and surgical cultures at the time

grew an ESBL E coli, and he was dischrged with IV meropenem through a midline 

with plans to complete 2 weeks of therapy. He was also discharged with doxycy

rojo and Augmentin. 





Febrile to 101.5 with a white count of 5. Currently on meropenem, doxycycline, 

and Augmentin. COVID testing negative. Blood cultures NGTD. 





Imaging personally reviewed:


CXR: small area of PNA in LLL





Review of Systems: Bold if positive, otherwise negative


General: fevers, chills, rigors


HEENT: visual disturbance, diplopia, eye pain


Respiratory: cough, sputum, hemoptysis, shortness of breath


Cardiovascular: chest pain, syncope


Gastrointestinal: nausea, vomiting, diarrhea, abdominal pain


Genitourinary: dysuria, hematuria, flank pain


Musculoskeletal: neck pain, back pain, joint pain, edema 


Neurologic: headaches, seizures


Hematologic: easy bruising or bleeding


Endocrine: night sweats, acute weight loss


Skin: rash, jaundice, redness


Psychiatric: suicidal, homicidal ideation





Medications and Allergies


                                    Allergies











Allergy/AdvReac Type Severity Reaction Status Date / Time


 


Sulfa (Sulfonamide Allergy  Rash Verified 05/31/20 19:19





Antibiotics)     











                                Home Medications











 Medication  Instructions  Recorded  Confirmed  Last Taken  Type


 


Balsalazide Disodium [Colazal] 750 mg PO TID 06/01/20 06/06/20 Unknown History


 


Insulin Detemir [Levemir VIAL] 25 unit SQ QHS 06/01/20 06/06/20 Unknown History


 


Insulin NPH/Regular [NovoLIN 70/30] 4 unit SUB-Q BID 06/01/20 06/06/20 Unknown 

History


 


OLANZapine [Zyprexa] 5 mg PO DAILY 06/01/20 06/06/20 Unknown History


 


Potassium Chloride [K-Dur] 10 meq PO BID 06/01/20 06/06/20 Unknown History


 


Tamsulosin [Flomax] 0.4 mg PO QDAY 06/01/20 06/06/20 Unknown History


 


ursodioL [Ursodiol] 300 mg PO BID 06/01/20 06/06/20 Unknown History


 


Amoxicillin/K Clav Tab [Augmentin 1 tab PO Q12HR #28 tab 06/02/20 06/06/20 

Unknown Rx





875 mg]     


 


Doxycycline Hyclate [Doxycycline 100 mg PO Q12HR #28 tab 06/02/20 06/06/20 

Unknown Rx





Hyclate TAB]     


 


Ibuprofen [Motrin 800 MG tab] 800 mg PO Q8HR PRN #20 tablet 06/02/20 06/06/20 

Unknown Rx


 


Acetaminophen [Acetaminophen TAB] 650 mg PO Q4H PRN  tablet 06/05/20 06/06/20 

Unknown Rx











Active Meds: 


Active Medications





Acetaminophen (Tylenol)  650 mg PO Q4H PRN


   PRN Reason: Pain MILD(1-3)/Fever >100.5/HA


   Last Admin: 06/07/20 17:26 Dose:  650 mg


   Documented by: 


Amoxicillin/Clavulanate Potassium (Augmentin 875 Mg)  1 each PO Q12HR [facility]


   Last Admin: 06/08/20 09:08 Dose:  1 each


   Documented by: 


Doxycycline Hyclate (Vibramycin)  100 mg PO Q12HR [facility]


   Last Admin: 06/08/20 09:08 Dose:  100 mg


   Documented by: 


Famotidine (Pepcid)  20 mg PO BID [facility]


   Last Admin: 06/08/20 09:08 Dose:  20 mg


   Documented by: 


Hydromorphone HCl (Dilaudid)  0.5 mg IV Q3H PRN


   PRN Reason: Pain , Severe (7-10)


   Last Admin: 06/07/20 20:50 Dose:  0.5 mg


   Documented by: 


Meropenem (Merrem/Ns 500 Mg/50 Ml)  500 mg in 50 mls @ 50 mls/hr IV Q6HR [facility]


   Stop: 06/11/20 00:00


   Last Admin: 06/08/20 11:02 Dose:  50 mls/hr


   Documented by: 


Ibuprofen (Ibuprofen)  800 mg PO Q8HR PRN


   PRN Reason: Pain, Moderate (4-6)


   Last Admin: 06/08/20 14:17 Dose:  800 mg


   Documented by: 


Insulin Glargine (Lantus)  30 units SUB-Q QHS [facility]


Insulin Human Isoph/Insulin Regular (Humulin 70/30)  4 unit SUB-Q BIDDIAB [facility]


   Last Admin: 06/08/20 09:08 Dose:  4 unit


   Documented by: 


Metoclopramide HCl (Reglan)  10 mg IV Q6H PRN


   PRN Reason: Nausea And Vomiting


Miscellaneous Medication (Balsalazide Disodium [Colazal])  750 mg PO TID [facility]


Miscellaneous Medication (Ursodiol [Ursodiol])  300 mg PO BID [facility]


Multivitamins/Minerals (Caltrate Plus)  1 each PO BID [facility]


   Last Admin: 06/08/20 09:08 Dose:  1 each


   Documented by: 


Olanzapine (Zyprexa)  5 mg PO DAILY [facility]


   Last Admin: 06/08/20 09:08 Dose:  5 mg


   Documented by: 


Ondansetron HCl (Zofran)  4 mg IV Q8H PRN


   PRN Reason: Nausea And Vomiting


Oxycodone/Acetaminophen (Percocet 5/325)  1 tab PO Q6H PRN


   PRN Reason: Pain, Moderate (4-6)


   Last Admin: 06/08/20 08:08 Dose:  1 tab


   Documented by: 


Potassium Chloride (K-Dur)  10 meq PO BID [facility]


   Last Admin: 06/08/20 09:08 Dose:  10 meq


   Documented by: 


Sodium Chloride (Sodium Chloride Flush Syringe 10 Ml)  10 ml IV BID [facility]


   Last Admin: 06/08/20 09:09 Dose:  Not Given


   Documented by: 


Sodium Chloride (Sodium Chloride Flush Syringe 10 Ml)  10 ml IV PRN PRN


   PRN Reason: LINE FLUSH


Tamsulosin HCl (Flomax)  0.4 mg PO QDAY [facility]


   Last Admin: 06/08/20 09:09 Dose:  0.4 mg


   Documented by: 











Physical Examination





- Physical Exam


Narrative exam: 





Physical Exam: 


Constitutional: Alert, cooperative. No acute distress


Head, Ears, Nose: Normocephalic, atraumatic. External ears, nose normal


Eyes: Conjunctivae/corneas clear. No icterus. No ptosis.


Neck: Supple, no meningeal signs


Oral: dentition fair, no thrush


Cardiovascular: S1, S2 normal. 


Respiratory: Good air entry, clear to auscultation bilaterally


GI: Soft, non-tender; bowel sounds normal. No peritoneal signs. 


Musculoskeletal: No pedal edema, no cyanosis.


Skin: No rash or abscess


Hem/Lymphatic: No palpable cervical or supraclavicular nodes. No lymphangitis


Psych: Mood ok. Affect normal


Neurological: Awake, alert, oriented. No gross abnormality





- Constitutional


Vitals: 


                                   Vital Signs











Temp Pulse Resp BP Pulse Ox


 


 98.7 F   76   20   98/69   96 


 


 06/08/20 11:35  06/08/20 11:35  06/08/20 11:35  06/08/20 14:16  06/08/20 11:35








                           Temperature -Last 24 Hours











Temperature                    98.7 F


 


Temperature                    99.8 F


 


Temperature                    98.5 F


 


Temperature                    101.0 F

















Results





- Labs


CBC & Chem 7: 


                                 06/07/20 05:58





                                 06/07/20 05:58


Labs: 


                              Abnormal lab results











  06/07/20 06/08/20 06/08/20 Range/Units





  16:27 07:31 11:46 


 


POC Glucose  326 H  145 H  361 H  ()  














Assessment and Plan





Cultures:


Blood culture 6/6/2020 pending





A/P: 35 yo M PMHx perirectal abscess admitted with fevers and pain in the 

perirectal area. 





#Acute sepsis: present with fevers and tachycardia. Unclear source, pneumonia vs

 abscess recurrence. I am unclear as to why the patient was discharged on 

doxy/Augmentin as well as the meropenem as it is not in Dr. Srivastava's note, and 

much of the antibiotic spectrum is the same, and both drugs are clearly 

ineffective against an ESBL. 





#Perirectal abscess: recommend CT of the abdomen/pelvis to ensure no 

reaccumulation of the abscess fluid given ongoing pain in the area. Continue 

meropenem.





#Pneumonia: small area of pneumonia, possible cause of fevers. However, would be

 odd to develop on meropenem unless viral. Procal only mildly elevated. 





Recs:


-CT abdo/pelvis with contrast for abscess


-Stopped doxycycline and Augmentin


-Continue meropenem





Thank you for the consult, we will continue to follow.





BRITT Maria MD


[facility] ([facility])


M: [phone]


O: [phone]


F: [phone]

## 2020-06-08 NOTE — CAT SCAN REPORT
CT ABDOMEN AND PELVIS WITH CONTRAST



HISTORY: Pelvic pain, history of perirectal abscess



COMPARISON: Previous CT on 5/31/2020



TECHNIQUE: Routine abdominal and pelvic CT exam performed  following intravenous contrast administrat
ion. The patient received 100 mL of IV Omnipaque 300. All CT scans at this location are performed usi
ng CT dose reduction for ALARA by means of automated exposure control.



FINDINGS:



CT ABDOMEN:

Lung Bases: There are tiny bilateral pleural effusions that have developed since the prior study.

Liver: No significant abnormality.

Biliary: No significant abnormality.

Spleen: No significant abnormality.  Unenlarged.

Pancreas: No significant abnormality.

Adrenals: No significant abnormality.

Kidneys: No significant abnormality.

Lymphatics: No lymphadenopathy.

Vasculature: No significant abnormality. 

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 In t
he left due to a fold, there is a fluid and air collection measuring 3.1 cm. This has progressed sinc
e the prior exam. There is stable perirectal inflammation as well.



CT PELVIC:

: No significant abnormality.

Lymphatics: No lymphadenopathy.



Osseous Structures: No aggressive appearing osseous lesions.

Additional Findings: None



IMPRESSION:

1. Interval development of 3.1 cm abscess in the left gluteal fold adjacent to the known perirectal f
istula.

2. Largely stable perirectal inflammatory change.

3. Interval development of tiny bilateral pleural effusions.



Signer Name: Kenneth Garcia MD 

Signed: 6/8/2020 8:33 PM

Workstation Name: [id number]

## 2020-06-09 RX ADMIN — INSULIN LISPRO SCH UNIT: 100 INJECTION, SOLUTION INTRAVENOUS; SUBCUTANEOUS at 16:56

## 2020-06-09 RX ADMIN — FAMOTIDINE SCH MG: 20 TABLET ORAL at 21:52

## 2020-06-09 RX ADMIN — POTASSIUM CHLORIDE SCH MEQ: 1500 TABLET, EXTENDED RELEASE ORAL at 21:53

## 2020-06-09 RX ADMIN — OXYCODONE AND ACETAMINOPHEN PRN TAB: 5; 325 TABLET ORAL at 21:52

## 2020-06-09 RX ADMIN — MEROPENEM SCH MLS/HR: 500 INJECTION, POWDER, FOR SOLUTION INTRAVENOUS at 00:00

## 2020-06-09 RX ADMIN — MEROPENEM SCH MLS/HR: 1 INJECTION, POWDER, FOR SOLUTION INTRAVENOUS at 15:18

## 2020-06-09 RX ADMIN — OXYCODONE AND ACETAMINOPHEN PRN TAB: 5; 325 TABLET ORAL at 16:56

## 2020-06-09 RX ADMIN — OXYCODONE AND ACETAMINOPHEN PRN TAB: 5; 325 TABLET ORAL at 11:08

## 2020-06-09 RX ADMIN — MEROPENEM SCH MLS/HR: 1 INJECTION, POWDER, FOR SOLUTION INTRAVENOUS at 21:53

## 2020-06-09 RX ADMIN — INSULIN HUMAN SCH UNIT: 100 INJECTION, SUSPENSION SUBCUTANEOUS at 11:09

## 2020-06-09 RX ADMIN — INSULIN GLARGINE SCH UNITS: 100 INJECTION, SOLUTION SUBCUTANEOUS at 21:46

## 2020-06-09 RX ADMIN — TAMSULOSIN HYDROCHLORIDE SCH MG: 0.4 CAPSULE ORAL at 11:08

## 2020-06-09 RX ADMIN — FAMOTIDINE SCH MG: 20 TABLET ORAL at 11:08

## 2020-06-09 RX ADMIN — Medication SCH ML: at 21:53

## 2020-06-09 RX ADMIN — Medication SCH ML: at 11:09

## 2020-06-09 RX ADMIN — IBUPROFEN PRN MG: 800 TABLET, FILM COATED ORAL at 21:45

## 2020-06-09 RX ADMIN — Medication SCH EACH: at 11:09

## 2020-06-09 RX ADMIN — IBUPROFEN PRN MG: 800 TABLET, FILM COATED ORAL at 02:24

## 2020-06-09 RX ADMIN — INSULIN LISPRO SCH UNIT: 100 INJECTION, SOLUTION INTRAVENOUS; SUBCUTANEOUS at 21:47

## 2020-06-09 RX ADMIN — Medication SCH EACH: at 21:53

## 2020-06-09 RX ADMIN — POTASSIUM CHLORIDE SCH MEQ: 1500 TABLET, EXTENDED RELEASE ORAL at 11:08

## 2020-06-09 RX ADMIN — MEROPENEM SCH MLS/HR: 500 INJECTION, POWDER, FOR SOLUTION INTRAVENOUS at 06:41

## 2020-06-09 RX ADMIN — INSULIN LISPRO SCH UNIT: 100 INJECTION, SOLUTION INTRAVENOUS; SUBCUTANEOUS at 12:17

## 2020-06-09 NOTE — PROGRESS NOTE
Assessment and Plan





- Patient Problems


(1) Left lower lobe pneumonia


Current Visit: Yes   Status: Acute   


Plan to address problem: 


Patient on appropriate abx


COnt Meropenem.


Reason for admitting is rule out COvid reated pna


If Corona virus negative --Patient maybe discharged on same abx


Isolation tillo then








(2) Perirectal abscess


Current Visit: Yes   Status: Acute   


Plan to address problem: 


Cont Meropenem and Augmentin








(3) IDDM (insulin dependent diabetes mellitus)


Current Visit: Yes   Status: Chronic   


Plan to address problem: 


Recent last A1c 7.1 a week ago


Cont Home Insulin and coverage








(4) BPH (benign prostatic hyperplasia)


Current Visit: Yes   Status: Chronic   


Qualifiers: 


   Lower urinary tract symptom presence: symptoms present 


Plan to address problem: 


Cont Flomax








(5) Malnutrition


Current Visit: Yes   Status: Acute   


Qualifiers: 


   Protein-calorie malnutrition severity: severe 


Plan to address problem: 


Albumin 2.1


Dietitian consult


Dietary supplements ordered








(6) Hypocalcemia


Current Visit: Yes   Status: Acute   


Plan to address problem: 


Supplemented








(7) DVT prophylaxis


Current Visit: No   Status: Acute   


Plan to address problem: 


On Lovenox and GI prophylaxis








Subjective


Date of service: 06/09/20


Principal diagnosis: Pneumonia


Interval history: 





Patient is 34 years old male with no significant past medical history except for

his recent admission to the hospital for perirectal abscess.  Patient was 

discharged from the hospital yesterday with a PICC line and an order for 

meropenem, Augmentin and doxycycline.  Patient brought to the emergency room 

from home for evaluation of fever and pain to the local area.  Patient had an 

I&D done by Dr. Cahvez on June 1.  Patient denies any cough, shortness of 

breath, runny nose congestion.  Patient also denied any urinary symptoms.  

Sepsis protocol initiated and patient received a dose of meropenem, normal 

saline and, Tylenol and morphine.


Work up in ED revealed L medial pneumonia


Patient on appropriate abx.








Objective





- Constitutional


Vitals: 


                               Vital Signs - 12hr











  06/09/20 06/09/20





  06:07 11:07


 


Temperature 97.0 F L 97.8 F


 


Pulse Rate 64 75


 


Respiratory 16 19





Rate  


 


Blood Pressure 96/56 95/61


 


O2 Sat by Pulse 99 94





Oximetry  











General appearance: Present: no acute distress, well-nourished





- EENT


Eyes: PERRL, EOM intact


ENT: hearing intact, clear oral mucosa


Ears: bilateral: normal





- Neck


Neck: supple, normal ROM





- Respiratory


Respiratory effort: normal


Respiratory: bilateral: CTA





- Breasts


Breasts: normal





- Cardiovascular


Rhythm: regular


Heart Sounds: Present: S1 & S2.  Absent: gallop, rub


Extremities: pulses intact, No edema, normal color, Full ROM





- Gastrointestinal


General gastrointestinal: Present: soft, non-tender, non-distended, normal bowel

sounds





- Genitourinary


Male genitourinary: normal





- Integumentary


Integumentary: clear, warm, dry





- Musculoskeletal


Musculoskeletal: 1, strength equal bilaterally





- Neurologic


Neurologic: moves all extremities





- Psychiatric


Psychiatric: memory intact, appropriate mood/affect, intact judgment & insight





- Labs


CBC & Chem 7: 


                                 06/07/20 05:58





                                 06/07/20 05:58


Labs: 


                              Abnormal lab results











  06/08/20 06/08/20 06/09/20 Range/Units





  16:22 21:41 11:26 


 


POC Glucose  300 H  340 H  379 H  ()

## 2020-06-10 RX ADMIN — OXYCODONE AND ACETAMINOPHEN PRN TAB: 5; 325 TABLET ORAL at 16:05

## 2020-06-10 RX ADMIN — OXYCODONE AND ACETAMINOPHEN PRN TAB: 5; 325 TABLET ORAL at 08:48

## 2020-06-10 RX ADMIN — FAMOTIDINE SCH MG: 20 TABLET ORAL at 22:00

## 2020-06-10 RX ADMIN — OXYCODONE AND ACETAMINOPHEN PRN TAB: 5; 325 TABLET ORAL at 22:00

## 2020-06-10 RX ADMIN — POTASSIUM CHLORIDE SCH MEQ: 1500 TABLET, EXTENDED RELEASE ORAL at 11:45

## 2020-06-10 RX ADMIN — INSULIN GLARGINE SCH UNITS: 100 INJECTION, SOLUTION SUBCUTANEOUS at 21:58

## 2020-06-10 RX ADMIN — MEROPENEM SCH MLS/HR: 1 INJECTION, POWDER, FOR SOLUTION INTRAVENOUS at 06:35

## 2020-06-10 RX ADMIN — Medication SCH EACH: at 11:44

## 2020-06-10 RX ADMIN — Medication SCH ML: at 14:49

## 2020-06-10 RX ADMIN — INSULIN LISPRO SCH UNIT: 100 INJECTION, SOLUTION INTRAVENOUS; SUBCUTANEOUS at 12:22

## 2020-06-10 RX ADMIN — Medication SCH ML: at 11:45

## 2020-06-10 RX ADMIN — Medication SCH ML: at 22:01

## 2020-06-10 RX ADMIN — Medication SCH EACH: at 22:00

## 2020-06-10 RX ADMIN — TAMSULOSIN HYDROCHLORIDE SCH MG: 0.4 CAPSULE ORAL at 11:43

## 2020-06-10 RX ADMIN — FAMOTIDINE SCH MG: 20 TABLET ORAL at 11:44

## 2020-06-10 RX ADMIN — MEROPENEM SCH MLS/HR: 1 INJECTION, POWDER, FOR SOLUTION INTRAVENOUS at 21:58

## 2020-06-10 RX ADMIN — INSULIN LISPRO SCH UNIT: 100 INJECTION, SOLUTION INTRAVENOUS; SUBCUTANEOUS at 22:00

## 2020-06-10 RX ADMIN — MEROPENEM SCH MLS/HR: 1 INJECTION, POWDER, FOR SOLUTION INTRAVENOUS at 14:43

## 2020-06-10 RX ADMIN — IBUPROFEN PRN MG: 800 TABLET, FILM COATED ORAL at 12:21

## 2020-06-10 RX ADMIN — INSULIN LISPRO SCH UNIT: 100 INJECTION, SOLUTION INTRAVENOUS; SUBCUTANEOUS at 17:16

## 2020-06-10 RX ADMIN — INSULIN LISPRO SCH: 100 INJECTION, SOLUTION INTRAVENOUS; SUBCUTANEOUS at 08:41

## 2020-06-10 RX ADMIN — POTASSIUM CHLORIDE SCH MEQ: 1500 TABLET, EXTENDED RELEASE ORAL at 21:59

## 2020-06-10 NOTE — PROGRESS NOTE
Assessment and Plan





Cultures:


Blood culture 6/6/2020 pending





A/P: 33 yo M PMHx perirectal abscess admitted with fevers and pain in the 

perirectal area. 





#Acute sepsis: present with fevers and tachycardia. Unclear source, pneumonia vs

abscess recurrence. I am unclear as to why the patient was discharged on 

doxy/Augmentin as well as the meropenem as it is not in Dr. Srivastava's note, and 

much of the antibiotic spectrum is the same, and both drugs are clearly i

neffective against an ESBL. 





#Perirectal abscess: New abscess on gluteal fold next to perirectal fistula. 

continue meropenem.





#Pneumonia: small area of pneumonia, possible cause of fevers. However, would be

odd to develop on meropenem unless viral. Procal only mildly elevated. 





Recs:


-Consulted general surgery for evaluation of gluteal abscess, pending 

evaluation.


-Continue meropenem





Thank you for the consult, we will continue to follow.





BRITT Maria MD


[facility] ([facility])


M: [phone]


O: [phone]


F: [phone]





Subjective


Date of service: 06/10/20


Principal diagnosis: Pneumonia


Interval history: 





Afebrile, currently receiving meropenem.





Objective





- Exam


Narrative Exam: 





Physical Exam: 


Constitutional: Alert, cooperative. No acute distress


Head, Ears, Nose: Normocephalic, atraumatic


Eyes: Conjunctivae/corneas clear.


Oral: dentition fair, no thrush


Cardiovascular: S1, S2 normal. 


Respiratory: Good air entry, clear to auscultation bilaterally


GI: Soft, non-tender; bowel sounds normal. No peritoneal signs. 


Musculoskeletal: No pedal edema, no cyanosis.


Skin: No rash or abscess


Hem/Lymphatic: No palpable cervical or supraclavicular nodes. No lymphangitis


Psych: Mood ok. Affect normal


Neurological: Awake, alert, oriented. No gross abnormality





- Constitutional


Vitals: 


                                   Vital Signs











Temp Pulse Resp BP Pulse Ox


 


 97.8 F   113 H  16   100/60   99 


 


 06/10/20 12:06  06/10/20 12:06  06/10/20 12:06  06/10/20 12:06  06/10/20 12:06








                           Temperature -Last 24 Hours











Temperature                    97.8 F


 


Temperature                    98.1 F


 


Temperature                    99.5 F


 


Temperature                    98.6 F

















- Labs


CBC & Chem 7: 


                                 06/07/20 05:58





                                 06/07/20 05:58


Labs: 


                              Abnormal lab results











  06/09/20 06/09/20 06/10/20 Range/Units





  16:52 21:21 07:41 


 


POC Glucose  191 H  268 H  107 H  ()  














  06/10/20 Range/Units





  12:19 


 


POC Glucose  262 H  ()

## 2020-06-10 NOTE — CONSULTATION
History of Present Illness


Consult date: 06/10/20


Reason for consult: wound care


Requesting physician: HEATHER GARCIA


Chief complaint: 





continued pain





- History of present illness


History of present illness: 





33yo M who is well-known to our service as we recently performed an exam under 

anesthesia, seton placement, incision and drainage of perianal abscesses.  He 

returns to the hospital due to excessive pain.  He reports that the pain is 

unchanged from when he left the hospital.  However, he was only discharged with 

ibuprofen.  That was not enough to control the pain.  Therefore he returned.  

There was also a report of a fever.  He has been afebrile while in the hospital.

 He denies any other complaints.  He reports he was only able to do the sitz 

baths once.  He has not done anything else to keep the area clean.  He reports 

that there is still drainage.  We are asked to see him again to evaluate for any

new areas of infection.





Past History


Past Medical History: diabetes


Past Surgical History: Other (Perianal EUA, seton placement, I&D)


Social history: smoking.  denies: alcohol abuse, prescription drug abuse, IV 

drug use


Family history: no significant family history





Medications and Allergies


                                    Allergies











Allergy/AdvReac Type Severity Reaction Status Date / Time


 


Sulfa (Sulfonamide Allergy  Rash Verified 05/31/20 19:19





Antibiotics)     











                                Home Medications











 Medication  Instructions  Recorded  Confirmed  Last Taken  Type


 


Balsalazide Disodium [Colazal] 750 mg PO TID 06/01/20 06/06/20 Unknown History


 


Insulin Detemir [Levemir VIAL] 25 unit SQ QHS 06/01/20 06/06/20 Unknown History


 


Insulin NPH/Regular [NovoLIN 70/30] 4 unit SUB-Q BID 06/01/20 06/06/20 Unknown 

History


 


OLANZapine [Zyprexa] 5 mg PO DAILY 06/01/20 06/06/20 Unknown History


 


Potassium Chloride [K-Dur] 10 meq PO BID 06/01/20 06/06/20 Unknown History


 


Tamsulosin [Flomax] 0.4 mg PO QDAY 06/01/20 06/06/20 Unknown History


 


ursodioL [Ursodiol] 300 mg PO BID 06/01/20 06/06/20 Unknown History


 


Amoxicillin/K Clav Tab [Augmentin 1 tab PO Q12HR #28 tab 06/02/20 06/06/20 

Unknown Rx





875 mg]     


 


Doxycycline Hyclate [Doxycycline 100 mg PO Q12HR #28 tab 06/02/20 06/06/20 

Unknown Rx





Hyclate TAB]     


 


Ibuprofen [Motrin 800 MG tab] 800 mg PO Q8HR PRN #20 tablet 06/02/20 06/06/20 

Unknown Rx


 


Acetaminophen [Acetaminophen TAB] 650 mg PO Q4H PRN  tablet 06/05/20 06/06/20 

Unknown Rx











Active Meds: 


Active Medications





Acetaminophen (Tylenol)  650 mg PO Q4H PRN


   PRN Reason: Pain MILD(1-3)/Fever >100.5/HA


   Last Admin: 06/07/20 17:26 Dose:  650 mg


   Documented by: 


Famotidine (Pepcid)  20 mg PO BID [facility]


   Last Admin: 06/09/20 21:52 Dose:  20 mg


   Documented by: 


Hydromorphone HCl (Dilaudid)  0.5 mg IV Q3H PRN


   PRN Reason: Pain , Severe (7-10)


   Last Admin: 06/07/20 20:50 Dose:  0.5 mg


   Documented by: 


MEROPENEM/NS 1 GRAM/100 ML (Merrem/Ns 1 Gram/100 Ml)  1 gram in 100 mls @ 100 

mls/hr IV Q8HR [facility]


   Last Admin: 06/10/20 06:35 Dose:  100 mls/hr


   Documented by: 


Ibuprofen (Ibuprofen)  800 mg PO Q8HR PRN


   PRN Reason: Pain, Moderate (4-6)


   Last Admin: 06/09/20 21:45 Dose:  800 mg


   Documented by: 


Insulin [facility]us)  [facility]


   Last Admin: 06/09/20 21:46 Dose:  40 units


   Documented by: 


Insulin Human Lispro (Humalog)  0 unit SUB-Q [facility]; Protocol


   Last Admin: 06/10/20 08:41 Dose:  Not Given


   Documented by: 


Metoclopramide HCl (Reglan)  10 mg IV Q6H PRN


   PRN Reason: Nausea And Vomiting


Miscellaneous Medication (Balsalazide Disodium [Colazal])  750 mg PO TID [facility]


Miscellaneous Medication (Ursodiol [Ursodiol])  300 mg PO BID [facility]


Multivitamins/Minerals (Caltrate Plus)  1 each PO BID [facility]


   Last Admin: 06/09/20 21:53 Dose:  1 each


   Documented by: 


Olanzapine (Zyprexa)  5 mg PO DAILY [facility]


   Last Admin: 06/09/20 12:16 Dose:  5 mg


   Documented by: 


Ondansetron HCl (Zofran)  4 mg IV Q8H PRN


   PRN Reason: Nausea And Vomiting


Oxycodone/Acetaminophen (Percocet 5/325)  1 tab PO Q6H PRN


   PRN Reason: Pain, Moderate (4-6)


   Last Admin: 06/10/20 08:48 Dose:  1 tab


   Documented by: 


Potassium Chloride (K-Dur)  10 meq PO BID [facility]


   Last Admin: 06/09/20 21:53 Dose:  10 meq


   Documented by: 


Sodium Chloride (Sodium Chloride Flush Syringe 10 Ml)  10 ml IV BID [facility]


   Last Admin: 06/09/20 21:53 Dose:  10 ml


   Documented by: 


Sodium Chloride (Sodium Chloride Flush Syringe 10 Ml)  10 ml IV PRN PRN


   PRN Reason: LINE FLUSH


Tamsulosin HCl (Flomax)  0.4 mg PO QDAY [facility]


   Last Admin: 06/09/20 11:08 Dose:  0.4 mg


   Documented by: 











Review of Systems





- Constitutional


fever, chronic pain, no chills





- Cardiovascular


no chest pain, no shortness of breath





- Respiratory


no cough





- Gastrointestinal


no abdominal pain, no nausea, no vomiting





- Integumentary


redness, wounds





Exam


                                   Vital Signs











Temp Pulse Resp BP Pulse Ox


 


 103.2 F H  125 H  12   127/83   97 


 


 06/06/20 17:27  06/06/20 17:27  06/06/20 17:27  06/06/20 17:27  06/06/20 17:27














- General physical appearance


Positive: no distress, no pain, other (appears tired)





- Eyes


Positive: normal occular movement





- Respiratory


Positive: normal expansion, normal respiratory effort





- Rectum


Rectum: other (moderate amount of drainage on the diaper and on the perineum. 

Seton in place. Penrose drain has come out. +firm, tender area about 4cm in 

diameter in left buttock. No purulent drainage. +mucus drainage from right 

buttock)





- Neurologic


Neurologic: alert and oriented to time, place and person





- Psychiatric


Psychiatric: appropriate mood/affect, cooperative





Results





- Labs





                                 06/07/20 05:58





                                 06/07/20 05:58


                              Abnormal lab results











  06/09/20 06/09/20 06/09/20 Range/Units





  11:26 16:52 21:21 


 


POC Glucose  379 H  191 H  268 H  ()  














  06/10/20 Range/Units





  07:41 


 


POC Glucose  107 H  ()  














- Imaging


CT scan - abdomen: report reviewed, image reviewed


CT scan - pelvis: report reviewed, image reviewed





Assessment and Plan





- Patient Problems


(1) Perirectal abscess


Current Visit: Yes   Status: Acute   


Plan to address problem: 


Pt stable.  Overall, his situation is about the same compared to when he was 

discharged.  He is not keeping the area clean and dry.  The excess moisture 

against his skin is causing irritation and redness.  I am not sure if it is a 

lack of understanding or lack of initiative to try and keep the area clean and 

dry.  We discussed last time the importance of changing the pads and washing the

 area frequently to help heal the area.  Again, I found him sitting in a wet 

diaper.  He also does not understand or remember that we talked about the 

drainage from the wounds continuing for a while until the wounds heal.  He 

expressed concern when we met again today that the wounds are still draining.  

He also has poorly controlled blood sugars again.





Unfortunately, the Penrose drain did not stay in.  We will have to do regular, 

daily dressing changes which will be uncomfortable but will help clean the area.





There is a new firm area in the left buttock that most likely is related to the 

initial fistula site.  On CT scan there appears to be a small connection.





Our plan will be to go back to the operating room tomorrow to thoroughly washout

 the wounds and examine all the areas again under anesthesia.  I will make sure 

there are no new areas that need to be drained.  I will examine the left buttock

 area to see if it communicates with the previously identified fistula tract.





I have requested the wound care nurse to see the patient.  We will need to begin

 dressing changes and see if we can enroll him in the wound clinic.  These 

wounds will require many weeks to months for healing.  I fear it will take much 

longer as he is not very involved in his own care.





He is on the OR schedule for tomorrow.





We will follow along.  Please call with any questions.





Time=30min

## 2020-06-11 PROCEDURE — 0J990ZX DRAINAGE OF BUTTOCK SUBCUTANEOUS TISSUE AND FASCIA, OPEN APPROACH, DIAGNOSTIC: ICD-10-PCS | Performed by: SURGERY

## 2020-06-11 RX ADMIN — OXYCODONE AND ACETAMINOPHEN PRN TAB: 5; 325 TABLET ORAL at 23:20

## 2020-06-11 RX ADMIN — Medication SCH ML: at 12:12

## 2020-06-11 RX ADMIN — POTASSIUM CHLORIDE SCH: 1500 TABLET, EXTENDED RELEASE ORAL at 11:58

## 2020-06-11 RX ADMIN — MEROPENEM SCH MLS/HR: 1 INJECTION, POWDER, FOR SOLUTION INTRAVENOUS at 23:18

## 2020-06-11 RX ADMIN — Medication SCH ML: at 23:21

## 2020-06-11 RX ADMIN — HYDROMORPHONE HYDROCHLORIDE PRN MG: 1 INJECTION, SOLUTION INTRAMUSCULAR; INTRAVENOUS; SUBCUTANEOUS at 12:08

## 2020-06-11 RX ADMIN — HYDROMORPHONE HYDROCHLORIDE PRN MG: 1 INJECTION, SOLUTION INTRAMUSCULAR; INTRAVENOUS; SUBCUTANEOUS at 18:28

## 2020-06-11 RX ADMIN — INSULIN GLARGINE SCH UNITS: 100 INJECTION, SOLUTION SUBCUTANEOUS at 23:19

## 2020-06-11 RX ADMIN — MEROPENEM SCH MLS/HR: 1 INJECTION, POWDER, FOR SOLUTION INTRAVENOUS at 13:11

## 2020-06-11 RX ADMIN — POTASSIUM CHLORIDE SCH MEQ: 1500 TABLET, EXTENDED RELEASE ORAL at 23:20

## 2020-06-11 RX ADMIN — Medication SCH EACH: at 23:19

## 2020-06-11 RX ADMIN — TAMSULOSIN HYDROCHLORIDE SCH: 0.4 CAPSULE ORAL at 11:58

## 2020-06-11 RX ADMIN — SODIUM CHLORIDE SCH MLS/HR: 0.45 INJECTION, SOLUTION INTRAVENOUS at 23:36

## 2020-06-11 RX ADMIN — HYDROMORPHONE HYDROCHLORIDE PRN MG: 1 INJECTION, SOLUTION INTRAMUSCULAR; INTRAVENOUS; SUBCUTANEOUS at 18:42

## 2020-06-11 RX ADMIN — NICOTINE SCH MG: 14 PATCH TRANSDERMAL at 13:11

## 2020-06-11 RX ADMIN — INSULIN LISPRO SCH UNIT: 100 INJECTION, SOLUTION INTRAVENOUS; SUBCUTANEOUS at 23:22

## 2020-06-11 RX ADMIN — FAMOTIDINE SCH: 20 TABLET ORAL at 11:58

## 2020-06-11 RX ADMIN — Medication SCH: at 11:58

## 2020-06-11 RX ADMIN — INSULIN LISPRO SCH: 100 INJECTION, SOLUTION INTRAVENOUS; SUBCUTANEOUS at 11:57

## 2020-06-11 RX ADMIN — FAMOTIDINE SCH MG: 20 TABLET ORAL at 23:19

## 2020-06-11 RX ADMIN — MEROPENEM SCH MLS/HR: 1 INJECTION, POWDER, FOR SOLUTION INTRAVENOUS at 05:40

## 2020-06-11 NOTE — PROGRESS NOTE
Assessment and Plan





- Patient Problems


(1) Perirectal abscess


Current Visit: Yes   Status: Acute   


Plan to address problem: 


Pt stable.  Plan for surgery today to washout wounds and to examine for any new 

areas of infection.





I reviewed the CT in detail with Dr. Iraheta from radiology.  The possible new 

area in the left buttock does appear to communicate with our original abscess 

cavity on the left side.  It is possible there is air in there now from our 

probing during his last surgery.  All points of air were present previously just

in a slightly increased amount.  This too is most likely related from the 

original surgery.  We discussed the area near the base of the scrotum.  It 

appears to not involve the scrotum as further images of the CT show a normal 

scrotum as well as penis.  There is no evidence to suggest a Latesha's 

gangrene.





Patient acknowledges understanding of the plan today.  He knows that he must 

remain n.p.o. until after surgery.





We will follow along.  Please call with any questions.





Time=10min








Subjective


Date of service: 06/11/20


Patient Reports: Positive: no new complaints, still having pain





Objective


                               Vital Signs - 12hr











  06/10/20 06/11/20





  21:47 05:20


 


Temperature 98.3 F 98.4 F


 


Pulse Rate 100 H 103 H


 


Respiratory 18 18





Rate  


 


Blood Pressure 98/66 97/67


 


O2 Sat by Pulse 97 97





Oximetry  














- General physical appearance


no distress, no pain





- Eyes


normal occular movement





- Respiratory


normal expansion, normal respiratory effort





- Psychiatric


oriented to time, oriented to person, oriented to place, speech is normal, 

memory intact





- Labs





                                 06/07/20 05:58





                                 06/07/20 05:58

## 2020-06-11 NOTE — POST OPERATIVE NOTE
Date of procedure: 06/11/20 (dictation:[id number])


Pre-op diagnosis: perianal abscesses


Post-op diagnosis: other (as above, anal canal mass)


Findings: 





excess anal tissue in canal





the left buttock "abscess" was simply a pocket that connected to original 

abscess. No further pus found. 


Procedure: 





EUA


washout of abscess areas


I&D of left buttock abscess cavity


Bilateral pudendal nerve blocks





IVF 750cc


EBL <20cc


Anesthesia: GETA


Surgeon: RHIANNA LECHUGA


Estimated blood loss: minimal


Pathology: list (anal tissue mass)


Specimen disposition: to lab


Condition: stable


Disposition: PACU

## 2020-06-11 NOTE — PROGRESS NOTE
Assessment and Plan





- Patient Problems


(1) Left lower lobe pneumonia


Current Visit: Yes   Status: Acute   


Plan to address problem: 


Patient on appropriate abx


COnt Meropenem.


Reason for admitting is rule out COvid reated pna


If Corona virus negative --Patient maybe discharged on same abx


Isolation tillo then








(2) Perirectal abscess


Current Visit: Yes   Status: Acute   


Plan to address problem: 


Cont Meropenem 


For OR tomorrow for further eval of the extent of wound








(3) IDDM (insulin dependent diabetes mellitus)


Current Visit: Yes   Status: Chronic   


Plan to address problem: 


Recent last A1c 7.1 a week ago


Cont Home Insulin and coverage








(4) BPH (benign prostatic hyperplasia)


Current Visit: Yes   Status: Chronic   


Qualifiers: 


   Lower urinary tract symptom presence: symptoms present 


Plan to address problem: 


Cont Flomax








(5) Malnutrition


Current Visit: Yes   Status: Acute   


Qualifiers: 


   Protein-calorie malnutrition severity: severe 


Plan to address problem: 


Albumin 2.1


Dietitian consult


Dietary supplements ordered








(6) Hypocalcemia


Current Visit: Yes   Status: Acute   


Plan to address problem: 


Supplemented








(7) DVT prophylaxis


Current Visit: No   Status: Acute   


Plan to address problem: 


On Lovenox and GI prophylaxis








Subjective


Date of service: 06/10/20


Principal diagnosis: Pneumonia


Interval history: 





Patient is 34 years old male with no significant past medical history except for

his recent admission to the hospital for perirectal abscess.  Patient was 

discharged from the hospital yesterday with a PICC line and an order for 

meropenem, Augmentin and doxycycline.  Patient brought to the emergency room 

from home for evaluation of fever and pain to the local area.  Patient had an 

I&D done by Dr. Chavez on June 1.  Patient denies any cough, shortness of 

breath, runny nose congestion.  Patient also denied any urinary symptoms.  

Sepsis protocol initiated and patient received a dose of meropenem, normal 

saline and, Tylenol and morphine.


Work up in ED revealed L medial pneumonia


Patient on appropriate abx.


ID and Gen surgery consulted








Objective





- Constitutional


Vitals: 


                               Vital Signs - 12hr











  06/10/20 06/11/20





  21:47 05:20


 


Temperature 98.3 F 98.4 F


 


Pulse Rate 100 H 103 H


 


Respiratory 18 18





Rate  


 


Blood Pressure 98/66 97/67


 


O2 Sat by Pulse 97 97





Oximetry  











General appearance: Present: no acute distress, well-nourished





- EENT


Eyes: PERRL, EOM intact


ENT: hearing intact, clear oral mucosa


Ears: bilateral: normal





- Neck


Neck: supple, normal ROM





- Respiratory


Respiratory effort: normal


Respiratory: bilateral: CTA





- Breasts


Breasts: normal





- Cardiovascular


Rhythm: regular


Heart Sounds: Present: S1 & S2.  Absent: gallop, rub


Extremities: pulses intact, No edema, normal color, Full ROM, abnormal 

(Perirectal abscess )





- Gastrointestinal


General gastrointestinal: Present: soft, non-tender, non-distended, normal bowel

sounds





- Genitourinary


Male genitourinary: normal





- Integumentary


Integumentary: clear, warm, dry





- Musculoskeletal


Musculoskeletal: 1, strength equal bilaterally





- Neurologic


Neurologic: moves all extremities





- Psychiatric


Psychiatric: memory intact, appropriate mood/affect, intact judgment & insight





- Labs


CBC & Chem 7: 


                                 06/07/20 05:58





                                 06/07/20 05:58


Labs: 


                              Abnormal lab results











  06/10/20 06/10/20 Range/Units





  07:41 12:19 


 


POC Glucose  107 H  262 H  ()

## 2020-06-11 NOTE — OPERATIVE REPORT
PREOPERATIVE DIAGNOSIS:  Perianal abscesses.



POSTOPERATIVE DIAGNOSES:  Perianal abscesses.  Excess anal mucosal tissue.



PROCEDURES:

1.  Exam under anesthesia.

2.  Washout of abscess cavities.

3.  Incision and drainage of left buttock abscess cavity.

4.  Bilateral pudendal nerve blocks.



ATTENDING PHYSICIAN:  Mildred Chavez MD



ANESTHESIA:  General.



ESTIMATED BLOOD LOSS:  Less than 20 mL.



FLUIDS:  750 mL.



FINDINGS:  Multiple areas of overgrowth of anal mucosal tissue.  Please note

this was seen during the last evaluation as well.  Evaluation of the space at 
the base of the scrotum revealed

no evidence of infection.  No further purulent material was identified.  It was

simply a poorly kept wound.  It was simply stool and mucous that had

collected in that area.



SPECIMENS:  Anal mucosal tissue.



DRAINS:  Half inch Penrose drain was placed in the left buttock.



COMPLICATIONS:  None.



DISPOSITION:  Stable, transferred to Recovery Room.



INDICATIONS:  This is a 34-year-old male who is well known to our service as he

originally presented approximately a week ago to the hospital.  He had been

suffering with perianal abscesses for at least a month while in long-term.  We took

him for exam under anesthesia and drainage of the pockets.  He returned the day

after discharge with complaints of poorly managed pain.  He was discharged on

ibuprofen.  He said there was not enough to manage his pain.  He had no other

changes or concerns during the workup for the readmission.  Repeat CT scan was

done.  There was concern about potential new areas of infection.  Therefore,

General Surgery was asked to participate in the care again.  The patient is

assessed to be need for another exam under anesthesia when we initially

evaluated him.  The entire perineum was covered in stool and the skin was

irritated as a result.  Therefore, the patient is assessed to be need for

another exam under anesthesia with possible incision and drainage and washout of

the wounds.  Procedure, risks, benefits were explained to the patient.  Risks

include but were not limited to infection, bleeding, pain, injury to surrounding

structures, possible need for further procedures in the future.  The patient

understood and consented.



OPERATIVE NOTE:  The patient was brought to the operating room and placed on the

table in supine position.  After adequate general anesthesia was established,

the patient was placed in lithotomy position and then prepped and draped in

usual sterile fashion.  Pressure points were padded.  SCDs were in place.  The

patient was already on antibiotics.  Timeout was called.  I began by evaluating

the left abscess cavity that we originally drained and I inserted my finger

towards the "possible new abscess".  It ended up being simply continuation of

the

original abscess cavity. We had dissected this out before hence, there was

increased air on the new CT scan because of the communication.  I found no

additional pus in this area.  To minimize any collection of fluid in this area, 
I

made a small exit wound inferiorly and then brought a Penrose drain through that

area, so that it would stay open and allow for any drainage of fluid that may

collect.  I probed at the top part of the left side of the abscess to see if it

communicated at all with the skin at the base of the scrotum or base of the 
thigh. 

I found no obvious connection.  It must be very small.  I ended up making a

small incision at the base of the scrotum, not into the scrotum, but just in the

perineal area.  There was absolutely no evidence of any infection.  The area

that is tracking there must be just from pressure from the original surgery. 

The Seton was in place.  The fistula was wide open.  Finally, the

right buttock abscess cavity had mucus that had collected in there, but nothing

else.  At this point, I thoroughly washed out all the areas with pulse lavage

and with a bulb syringe.  Everything was coming back clear.  I packed all the

wounds with calcium alginate.  I did bilateral pudendal nerve blocks.  I

identified the ischial spine and then guided the needle until it hit the ischial

spine aspirating all along to make sure we did not hit any blood vessel. I made 
sure we were aware from the abscess cavities/tracts. I

injected 10 mL of combination of 0.5% Marcaine and 1% lidocaine into each side. 

The patient tolerated the procedure well.  There were no complications. 

Dressings were placed.  All counts were correct at the end of the case.  



Please note that I took a biopsy of the excess anal mucosal tissue even though 
we had

seen it last time as I presumed it was related to chronic inflammatory state, I

thought it would be best to take a sample of one of the pieces of tissue just to

make sure there is no other diagnosis that may be causing all of these

abscesses.  It was sent for pathology evaluation.





DD: 06/11/2020 18:44

DT: 06/11/2020 19:12

JOB# [id number]  [id number]

NICKOLAS/EMELIA ASTUDILLO

## 2020-06-11 NOTE — PROGRESS NOTE
Assessment and Plan





Cultures:


Blood culture 6/6/2020 pending





A/P: 33 yo M PMHx perirectal abscess admitted with fevers and pain in the 

perirectal area. 





#Acute sepsis: present with fevers and tachycardia. Unclear source, pneumonia vs

abscess recurrence. I am unclear as to why the patient was discharged on 

doxy/Augmentin as well as the meropenem as it is not in Dr. Srivastava's note, and 

much of the antibiotic spectrum is the same, and both drugs are clearly i

neffective against an ESBL. 





#Perirectal abscess: New abscess on gluteal fold next to perirectal fistula. 

continue meropenem.





#Pneumonia: small area of pneumonia, possible cause of fevers. However, would be

odd to develop on meropenem unless viral. Procal only mildly elevated. 





Recs:


-Patient to return to the OR today. If any new purulence encountered request new

cultures. 


-Given his poor compliance (likely through poor understanding) with wound care, 

some concern of how compliant he is with the IV antibiotics. 


-Continue meropenem





Thank you for the consult, we will continue to follow.





BRITT Maria MD


[facility] ([facility])


M: [phone]


O: [phone]


F: [phone]





Subjective


Date of service: 06/11/20


Principal diagnosis: Pneumonia


Interval history: 





No new issues. Patient to return to the OR today for wound exploration. 





Objective





- Exam


Narrative Exam: 





Physical Exam: 


Constitutional: Alert, cooperative. No acute distress


Head, Ears, Nose: Normocephalic, atraumatic


Eyes: Conjunctivae/corneas clear.


Oral: dentition fair, no thrush


Cardiovascular: S1, S2 normal. 


Respiratory: Good air entry, clear to auscultation bilaterally


GI: Soft, non-tender; bowel sounds normal. No peritoneal signs. 


Musculoskeletal: No pedal edema, no cyanosis.


Skin: No rash or abscess


Hem/Lymphatic: No palpable cervical or supraclavicular nodes. 


Psych: Mood ok. Affect normal


Neurological: Awake, alert, oriented. No gross abnormality





- Constitutional


Vitals: 


                                   Vital Signs











Temp Pulse Resp BP Pulse Ox


 


 98.4 F   103 H  18   97/67   97 


 


 06/11/20 05:20  06/11/20 05:20  06/11/20 05:20  06/11/20 05:20  06/11/20 05:20








                           Temperature -Last 24 Hours











Temperature                    98.4 F


 


Temperature                    98.3 F


 


Temperature                    97.3 F


 


Temperature                    97.8 F

















- Labs


CBC & Chem 7: 


                                 06/07/20 05:58





                                 06/07/20 05:58


Labs: 


                              Abnormal lab results











  06/10/20 06/10/20 06/10/20 Range/Units





  12:19 17:09 22:03 


 


POC Glucose  262 H  257 H  312 H  ()  














  06/11/20 Range/Units





  08:19 


 


POC Glucose  146 H  ()

## 2020-06-11 NOTE — ANESTHESIA CONSULTATION
Anesthesia Consult and Med Hx


Date of service: 06/11/20





- Airway


Anesthetic Teeth Evaluation: Poor


ROM Head & Neck: Adequate


Mental/Hyoid Distance: Adequate


Mallampati Class: Class III


Intubation Access Assessment: Probably Good (previous easy intubation)





- Pulmonary Exam


CTA: Yes





- Cardiac Exam


Cardiac Exam: RRR





- Pre-Operative Health Status


ASA Pre-Surgery Classification: ASA3


Proposed Anesthetic Plan: General





- Pulmonary


Hx Smoking: Yes


Hx Respiratory Symptoms: Yes (occasional nonproductive cough, concern for PNA on

CXR, COVID neg)


SOB: No





- Cardiovascular System


Hx Hypertension: No


Hx Heart Attack/AMI: No


Hx Percutaneous Transluminal Coronary Angioplasty (PTCA): No


Hx Cardia Arrhythmia: No





- Central Nervous System


CVA: No





- Gastrointestinal


Hx Gastroesophageal Reflux Disease: No





- Endocrine


Hx Renal Disease: No


Hx Liver Disease: No


Hx Insulin Dependent Diabetes: Yes





- Hematic


Hx Anemia: Yes





- Other Systems


Hx Obesity: No

## 2020-06-11 NOTE — PROGRESS NOTE
Assessment and Plan


Assessment and plan: 


Patient is 34 years old male with no significant past medical history except for

his recent admission to the hospital for perirectal abscess.  Patient was 

discharged from the hospital yesterday with a PICC line and an order for 

meropenem, Augmentin and doxycycline.  Patient brought to the emergency room 

from home for evaluation of fever and pain to the local area.  Patient had an 

I&D done by Dr. Chavez on June 1.  Patient denies any cough, shortness of 

breath, runny nose congestion.  Patient also denied any urinary symptoms.  

Sepsis protocol initiated and patient received a dose of meropenem, normal 

saline and, Tylenol and morphine.


Work up in ED revealed L medial pneumonia


Patient on appropriate abx.





(1) Left lower lobe pneumonia


Current Visit: Yes   Status: Acute   


Plan to address problem: 


Patient on appropriate abx


COnt Meropenem.


Reason for admitting is rule out COvid reated pna


If Corona virus negative --Patient maybe discharged on same abx


Isolation tillo then








(2) Perirectal abscess


Current Visit: Yes   Status: Acute   


Plan to address problem: 


Cont Meropenem 


For IR for tODAY or further eval of the extent of wound








(3) IDDM (insulin dependent diabetes mellitus)


Current Visit: Yes   Status: Chronic   


Plan to address problem: 


Recent last A1c 7.1 a week ago


Cont Home Insulin and coverage








(4) BPH (benign prostatic hyperplasia)


Current Visit: Yes   Status: Chronic   


Qualifiers: 


   Lower urinary tract symptom presence: symptoms present 


Plan to address problem: 


Cont Flomax








(5) Malnutrition


Current Visit: Yes   Status: Acute   


Qualifiers: 


   Protein-calorie malnutrition severity: severe 


Plan to address problem: 


Albumin 2.1


Dietitian consult


Dietary supplements ordered








(6) Hypocalcemia


Current Visit: Yes   Status: Acute   


Plan to address problem: 


Supplemented








(7) DVT prophylaxis


Current Visit: No   Status: Acute   


Plan to address problem: 


On Lovenox and GI prophylaxis








History


Interval history: 


Patient seen and examined this morning, later in th afternoon was noted withmi

naif








Hospitalist Physical





- Physical exam


Narrative exam: 


General appearance: Present: no acute distress, well-nourished





- EENT


Eyes: PERRL, EOM intact


ENT: hearing intact, clear oral mucosa


Ears: bilateral: normal





- Neck


Neck: supple, normal ROM





- Respiratory


Respiratory effort: normal


Respiratory: bilateral: CTA





- Breasts


Breasts: normal





- Cardiovascular


Rhythm: regular


Heart Sounds: Present: S1 & S2.  Absent: gallop, rub


Extremities: pulses intact, No edema, normal color, Full ROM, abnormal 

(Perirectal abscess )





- Gastrointestinal


General gastrointestinal: Present: soft, non-tender, non-distended, normal bowel

sounds





- Genitourinary


Male genitourinary: normal





- Integumentary


Integumentary: clear, warm, dry





- Musculoskeletal


Musculoskeletal: 1, strength equal bilaterally





- Neurologic





- Constitutional


Vitals: 


                                        











Temp Pulse Resp BP Pulse Ox


 


 97.0 F L  111 H  18   86/55   92 


 


 06/11/20 11:41  06/11/20 11:41  06/11/20 11:41  06/11/20 11:41  06/11/20 11:41











General appearance: Present: no acute distress, well-nourished





Results





- Labs


CBC & Chem 7: 


                                 06/07/20 05:58





                                 06/07/20 05:58


Labs: 


                             Laboratory Last Values











WBC  5.0 K/mm3 (4.5-11.0)   06/07/20  05:58    


 


RBC  3.28 M/mm3 (3.65-5.03)  L  06/07/20  05:58    


 


Hgb  9.4 gm/dl (11.8-15.2)  L  06/07/20  05:58    


 


Hct  29.2 % (35.5-45.6)  L  06/07/20  05:58    


 


MCV  89 fl (84-94)   06/07/20  05:58    


 


MCH  29 pg (28-32)   06/07/20  05:58    


 


MCHC  32 % (32-34)   06/07/20  05:58    


 


RDW  18.5 % (13.2-15.2)  H  06/07/20  05:58    


 


Plt Count  257 K/mm3 (140-440)   06/07/20  05:58    


 


Lymph % (Auto)  28.0 % (13.4-35.0)   06/07/20  05:58    


 


Mono % (Auto)  15.7 % (0.0-7.3)  H  06/07/20  05:58    


 


Eos % (Auto)  0.6 % (0.0-4.3)   06/07/20  05:58    


 


Baso % (Auto)  0.2 % (0.0-1.8)   06/07/20  05:58    


 


Lymph #  1.4 K/mm3 (1.2-5.4)   06/07/20  05:58    


 


Mono #  0.8 K/mm3 (0.0-0.8)   06/07/20  05:58    


 


Eos #  0.0 K/mm3 (0.0-0.4)   06/07/20  05:58    


 


Baso #  0.0 K/mm3 (0.0-0.1)   06/07/20  05:58    


 


Add Manual Diff  Complete   06/06/20  17:58    


 


Total Counted  100   06/06/20  17:58    


 


Seg Neutrophils %  55.5 % (40.0-70.0)   06/07/20  05:58    


 


Seg Neuts % (Manual)  67.0 % (40.0-70.0)   06/06/20  17:58    


 


Band Neutrophils %  0 %  06/06/20  17:58    


 


Lymphocytes % (Manual)  21.0 % (13.4-35.0)   06/06/20  17:58    


 


Reactive Lymphs % (Man)  0 %  06/06/20  17:58    


 


Monocytes % (Manual)  11.0 % (0.0-7.3)  H  06/06/20  17:58    


 


Eosinophils % (Manual)  1.0 % (0.0-4.3)   06/06/20  17:58    


 


Basophils % (Manual)  0 % (0.0-1.8)   06/06/20  17:58    


 


Metamyelocytes %  0 %  06/06/20  17:58    


 


Myelocytes %  0 %  06/06/20  17:58    


 


Promyelocytes %  0 %  06/06/20  17:58    


 


Blast Cells %  0 %  06/06/20  17:58    


 


Nucleated RBC %  Not Reportable   06/06/20  17:58    


 


Seg Neutrophils #  2.8 K/mm3 (1.8-7.7)   06/07/20  05:58    


 


Seg Neutrophils # Man  3.1 K/mm3 (1.8-7.7)   06/06/20  17:58    


 


Band Neutrophils #  0.0 K/mm3  06/06/20  17:58    


 


Lymphocytes # (Manual)  1.0 K/mm3 (1.2-5.4)  L  06/06/20  17:58    


 


Abs React Lymphs (Man)  0.0 K/mm3  06/06/20  17:58    


 


Monocytes # (Manual)  0.5 K/mm3 (0.0-0.8)   06/06/20  17:58    


 


Eosinophils # (Manual)  0.0 K/mm3 (0.0-0.4)   06/06/20  17:58    


 


Basophils # (Manual)  0.0 K/mm3 (0.0-0.1)   06/06/20  17:58    


 


Metamyelocytes #  0.0 K/mm3  06/06/20  17:58    


 


Myelocytes #  0.0 K/mm3  06/06/20  17:58    


 


Promyelocytes #  0.0 K/mm3  06/06/20  17:58    


 


Blast Cells #  0.0 K/mm3  06/06/20  17:58    


 


WBC Morphology  Not Reportable   06/06/20  17:58    


 


Hypersegmented Neuts  Not Reportable   06/06/20  17:58    


 


Hyposegmented Neuts  Not Reportable   06/06/20  17:58    


 


Hypogranular Neuts  Not Reportable   06/06/20  17:58    


 


Smudge Cells  Not Reportable   06/06/20  17:58    


 


Toxic Granulation  Not Reportable   06/06/20  17:58    


 


Toxic Vacuolation  Not Reportable   06/06/20  17:58    


 


Dohle Bodies  Not Reportable   06/06/20  17:58    


 


Pelger-Huet Anomaly  Not Reportable   06/06/20  17:58    


 


Tammy Rods  Not Reportable   06/06/20  17:58    


 


Platelet Estimate  Consistent w auto   06/06/20  17:58    


 


Clumped Platelets  Not Reportable   06/06/20  17:58    


 


Plt Clumps, EDTA  Not Reportable   06/06/20  17:58    


 


Large Platelets  Not Reportable   06/06/20  17:58    


 


Giant Platelets  Not Reportable   06/06/20  17:58    


 


Platelet Satelliting  Not Reportable   06/06/20  17:58    


 


Plt Morphology Comment  Not Reportable   06/06/20  17:58    


 


RBC Morphology  Not Reportable   06/06/20  17:58    


 


Dimorphic RBCs  Not Reportable   06/06/20  17:58    


 


Polychromasia  Few   06/06/20  17:58    


 


Hypochromasia  Not Reportable   06/06/20  17:58    


 


Poikilocytosis  Not Reportable   06/06/20  17:58    


 


Anisocytosis  Not Reportable   06/06/20  17:58    


 


Microcytosis  1+   06/06/20  17:58    


 


Macrocytosis  Not Reportable   06/06/20  17:58    


 


Spherocytes  Not Reportable   06/06/20  17:58    


 


Pappenheimer Bodies  Not Reportable   06/06/20  17:58    


 


Sickle Cells  Not Reportable   06/06/20  17:58    


 


Target Cells  Not Reportable   06/06/20  17:58    


 


Tear Drop Cells  Not Reportable   06/06/20  17:58    


 


Ovalocytes  Not Reportable   06/06/20  17:58    


 


Stomatocytes  Few   06/06/20  17:58    


 


Helmet Cells  Not Reportable   06/06/20  17:58    


 


[place] Bodies  Not Reportable   06/06/20  17:58    


 


Cabot Rings  Not Reportable   06/06/20  17:58    


 


[place] Cells  Not Reportable   06/06/20  17:58    


 


Bite Cells  Not Reportable   06/06/20  17:58    


 


Crenated Cell  Not Reportable   06/06/20  17:58    


 


Elliptocytes  Not Reportable   06/06/20  17:58    


 


Acanthocytes (Spur)  Not Reportable   06/06/20  17:58    


 


Rouleaux  Not Reportable   06/06/20  17:58    


 


Hemoglobin C Crystals  Not Reportable   06/06/20  17:58    


 


Schistocytes  Not Reportable   06/06/20  17:58    


 


Malaria parasites  Not Reportable   06/06/20  17:58    


 


Jerardo Bodies  Not Reportable   06/06/20  17:58    


 


Hem Pathologist Commnt  No   06/06/20  17:58    


 


D-Dimer  597.75 ng/mlDDU (0-234)  H  06/06/20  18:58    


 


Sodium  143 mmol/L (137-145)   06/07/20  05:58    


 


Potassium  3.8 mmol/L (3.6-5.0)   06/07/20  05:58    


 


Chloride  103.9 mmol/L ()   06/07/20  05:58    


 


Carbon Dioxide  26 mmol/L (22-30)   06/07/20  05:58    


 


Anion Gap  17 mmol/L  06/07/20  05:58    


 


BUN  5 mg/dL (9-20)  L  06/07/20  05:58    


 


Creatinine  0.7 mg/dL (0.8-1.5)  L  06/07/20  05:58    


 


Estimated GFR  > 60 ml/min  06/07/20  05:58    


 


BUN/Creatinine Ratio  7 %  06/07/20  05:58    


 


Glucose  131 mg/dL ()  H  06/07/20  05:58    


 


POC Glucose  138  ()  H  06/11/20  11:56    


 


Lactic Acid  1.10 mmol/L (0.7-2.0)   06/06/20  17:58    


 


Calcium  8.1 mg/dL (8.4-10.2)  L  06/07/20  05:58    


 


Ferritin  80.4 ng/mL (13.0-400.0)   06/06/20  18:58    


 


Total Bilirubin  0.30 mg/dL (0.1-1.2)   06/07/20  05:58    


 


AST  10 units/L (5-40)   06/07/20  05:58    


 


ALT  8 units/L (7-56)   06/07/20  05:58    


 


Alkaline Phosphatase  306 units/L ()  H  06/07/20  05:58    


 


Lactate Dehydrogenase  155 units/L ()   06/06/20  18:58    


 


C-Reactive Protein  7.20 mg/dL (0.00-1.30)  H  06/06/20  18:58    


 


Total Protein  5.9 g/dL (6.3-8.2)  L  06/07/20  05:58    


 


Albumin  2.3 g/dL (3.9-5)  L  06/07/20  05:58    


 


Albumin/Globulin Ratio  0.6 %  06/07/20  05:58    


 


Procalcitonin  0.23 ng/mL (<0.15)   06/06/20  18:58    


 


Urine Color  Straw  (Yellow)   06/06/20  17:31    


 


Urine Turbidity  Clear  (Clear)   06/06/20  17:31    


 


Urine pH  8.0  (5.0-7.0)  H  06/06/20  17:31    


 


Ur Specific Gravity  1.006  (1.003-1.030)   06/06/20  17:31    


 


Urine Protein  <15 mg/dl mg/dL (Negative)   06/06/20  17:31    


 


Urine Glucose (UA)  >=500 mg/dL (Negative)   06/06/20  17:31    


 


Urine Ketones  Neg mg/dL (Negative)   06/06/20  17:31    


 


Urine Blood  Neg  (Negative)   06/06/20  17:31    


 


Urine Nitrite  Neg  (Negative)   06/06/20  17:31    


 


Urine Bilirubin  Neg  (Negative)   06/06/20  17:31    


 


Urine Urobilinogen  < 2.0 mg/dL (<2.0)   06/06/20  17:31    


 


Ur Leukocyte Esterase  Neg  (Negative)   06/06/20  17:31    


 


Urine WBC (Auto)  1.0 /HPF (0.0-6.0)   06/06/20  17:31    


 


Urine RBC (Auto)  2.0 /HPF (0.0-6.0)   06/06/20  17:31    


 


Nasal Screen MRSA (PCR)  Negative  (Negative)   06/07/20  02:25    


 


Coronavirus (PCR)  Negative  (Negative)   06/07/20  09:10    











Microbiology: 


Microbiology





06/06/20 17:58   Peripheral/Venous   Blood Culture - Preliminary


                            NO GROWTH AFTER 4 DAYS


06/06/20 17:58   Peripheral/Venous   Blood Culture - Preliminary


                            NO GROWTH AFTER 4 DAYS








Senior/IV: 


                                        





Voiding Method                   Urinal


IV Catheter Type [Right Upper    Mid-line


arm]                             











Active Medications





- Current Medications


Current Medications: 














Generic Name Dose Route Start Last Admin





  Trade Name Freq  PRN Reason Stop Dose Admin


 


Acetaminophen  650 mg  06/06/20 23:31  06/07/20 17:26





  Tylenol  PO   650 mg





  Q4H PRN   Administration





  Pain MILD(1-3)/Fever >100.5/HA  


 


Famotidine  20 mg  06/07/20 22:00  06/11/20 11:58





  Pepcid  PO   Not Given





  BID VIRGIL  


 


Hydromorphone HCl  0.5 mg  06/06/20 23:34  06/11/20 12:08





  Dilaudid  IV   0.5 mg





  Q3H PRN   Administration





  Pain , Severe (7-10)  


 


MEROPENEM/NS 1 GRAM/100 ML  1 gram in 100 mls @ 100 mls/hr  06/09/20 14:00  

06/11/20 13:11





  Merrem/Ns 1 Gram/100 Ml  IV   100 mls/hr





  Q8HR VIRGIL   Administration


 


Sodium Chloride  1,000 mls @ 125 mls/hr  06/11/20 13:00 





  Nacl 0.45% 1000 Ml  IV  





  AS DIRECT VIRGIL  


 


Sodium Chloride  1,000 mls @ 0 mls/hr  06/11/20 12:00  06/11/20 12:05





  Nacl 0.9% 1000 Ml  IV  06/12/20 12:01  999 mls/hr





  ONCE VIRGIL   Administration





  As Directed  


 


Ibuprofen  800 mg  06/06/20 23:31  06/10/20 12:21





  Ibuprofen  PO   800 mg





  Q8HR PRN   Administration





  Pain, Moderate (4-6)  


 


Insulin Glargine  40 units  06/09/20 15:17  06/10/20 21:58





  Lantus  SUB-Q   40 units





  [facility]   Administration


 


Insulin Human Lispro  0 unit  06/09/20 11:30  06/11/20 11:57





  Humalog  SUB-Q   Not Given





  [facility]  





  Protocol  


 


Metoclopramide HCl  10 mg  06/06/20 23:34 





  Reglan  IV  





  Q6H PRN  





  Nausea And Vomiting  


 


Miscellaneous Medication  750 mg  06/07/20 08:00 





  Balsalazide Disodium [Colazal]  PO  





  TID [facility]  


 


Miscellaneous Medication  300 mg  06/06/20 23:45 





  Ursodiol [Ursodiol]  PO  





  BID [facility]  


 


Morphine Sulfate  2 mg  06/11/20 12:30 





  Morphine  IV  





  Q6H PRN  





  Pain, Moderate (4-6)  


 


Multivitamins/Minerals  1 each  06/07/20 10:00  06/11/20 11:58





  Caltrate Plus  PO   Not Given





  BID [facility]  


 


Nicotine  14 mg  06/11/20 14:00  06/11/20 13:11





  Habitrol  TD   14 mg





  QDAY [facility]   Administration


 


Olanzapine  5 mg  06/07/20 10:00  06/11/20 12:13





  Zyprexa  PO   Not Given





  DAILY [facility]  


 


Ondansetron HCl  4 mg  06/06/20 23:33 





  Zofran  IV  





  Q8H PRN  





  Nausea And Vomiting  


 


Oxycodone/Acetaminophen  1 tab  06/06/20 23:34  06/10/20 22:00





  Percocet 5/325  PO   1 tab





  Q6H PRN   Administration





  Pain, Moderate (4-6)  


 


Potassium Chloride  10 meq  06/06/20 23:45  06/11/20 11:58





  K-Dur  PO   Not Given





  BID [facility]  


 


Sodium Chloride  10 ml  06/07/20 10:00  06/11/20 12:12





  Sodium Chloride Flush Syringe 10 Ml  IV   10 ml





  BID [facility]   Administration


 


Sodium Chloride  10 ml  06/06/20 23:33 





  Sodium Chloride Flush Syringe 10 Ml  IV  





  PRN PRN  





  LINE FLUSH  


 


Tamsulosin HCl  0.4 mg  06/07/20 10:00  06/11/20 11:58





  Flomax  PO   Not Given





  QDAY [facility]  














Nutrition/Malnutrition Assess





- Dietary Evaluation


Nutrition/Malnutrition Findings: 


                                        





Nutrition Notes                                            Start:  06/07/20 

08:46


Freq:                                                      Status: Active       

 


Protocol:                                                                       

 


 Document     06/10/20 12:45  LP  (Rec: 06/10/20 12:52  LP  [id number])


 Nutrition Notes


     Initial or Follow up                        Reassessment


     Other Pertinent Diagnosis                   Fever, perirectal abscess,


                                                 Pneu, R/O COVID-19


     Current Diet                                Consistent CHO


     Labs/Tests                                  


     Pertinent Medications                       Reviewed


     Height                                      5 ft 8 in


     Weight                                      51.6 kg


     Ideal Body Weight (kg)                      70.00


     BMI                                         17.3


     Weight Status                               Underweight


     Subjective/Other Information                Pt consuming 100% of meals and


                                                 drinking supplements.


     Percent of energy/protein needs met:        100%/100%


     Burn                                        Absent


     Trauma                                      Absent


     GI Symptoms                                 None


     Minimum of two criteria                     No physical signs of


                                                 malnutrition


     #2


      Nutrition Diagnosis                        No nutrition diagnosis at this


                                                 time


     #1


      Nutrition Diagnosis                        Predicted suboptimal energy


                                                 intake


      As Evidenced by Signs and Symptoms         Pt consuming 100% of meals.


      Diagnosis Progress(for reassessment        Resolved


       documentation)                            


     Is patient on ventilator?                   No


     Is Patient Ambulatory and/or Out of Bed     Yes


     REE-([facility]-ambulatory/OOB) [     [id number]


      [email or web address]]                               


     Calculation Used for Recommendations        [facility]


     Additional Notes                            Protein needs are 56-67g (1-1.


                                                 2g/kg)


                                                 Fluid needs are 1ml/kcal


 Nutrition Intervention


     Change Diet Order:                          Continue


     Add Supplement/Snack (indicate name/kcal    Glucerna daily


      /protein )                                 


     Provides kCal:                              220


     Provides Protein (gm)                       10


     Goal #1                                     Meet at least 80% of kcal and


                                                 protein needs


     Anticipated Discharge Needs:                Consistent CHO diet and ONS as


                                                 needed


     Follow-Up By:                               06/16/20


     Additional Comments                         Follow for stable intakes

## 2020-06-12 RX ADMIN — ZINC OXIDE SCH APPLIC: 200 OINTMENT TOPICAL at 16:13

## 2020-06-12 RX ADMIN — SODIUM CHLORIDE SCH MLS/HR: 0.45 INJECTION, SOLUTION INTRAVENOUS at 09:39

## 2020-06-12 RX ADMIN — INSULIN LISPRO SCH UNIT: 100 INJECTION, SOLUTION INTRAVENOUS; SUBCUTANEOUS at 12:37

## 2020-06-12 RX ADMIN — INSULIN GLARGINE SCH UNITS: 100 INJECTION, SOLUTION SUBCUTANEOUS at 22:44

## 2020-06-12 RX ADMIN — ZINC OXIDE SCH APPLIC: 200 OINTMENT TOPICAL at 22:54

## 2020-06-12 RX ADMIN — MEROPENEM SCH MLS/HR: 1 INJECTION, POWDER, FOR SOLUTION INTRAVENOUS at 13:15

## 2020-06-12 RX ADMIN — MEROPENEM SCH MLS/HR: 1 INJECTION, POWDER, FOR SOLUTION INTRAVENOUS at 22:45

## 2020-06-12 RX ADMIN — MEROPENEM SCH MLS/HR: 1 INJECTION, POWDER, FOR SOLUTION INTRAVENOUS at 05:46

## 2020-06-12 RX ADMIN — MORPHINE SULFATE PRN MG: 2 INJECTION, SOLUTION INTRAMUSCULAR; INTRAVENOUS at 00:43

## 2020-06-12 RX ADMIN — POTASSIUM CHLORIDE SCH MEQ: 1500 TABLET, EXTENDED RELEASE ORAL at 22:43

## 2020-06-12 RX ADMIN — INSULIN LISPRO SCH UNIT: 100 INJECTION, SOLUTION INTRAVENOUS; SUBCUTANEOUS at 17:57

## 2020-06-12 RX ADMIN — FAMOTIDINE SCH MG: 20 TABLET ORAL at 09:35

## 2020-06-12 RX ADMIN — SODIUM CHLORIDE SCH MLS/HR: 0.45 INJECTION, SOLUTION INTRAVENOUS at 22:43

## 2020-06-12 RX ADMIN — MORPHINE SULFATE PRN MG: 2 INJECTION, SOLUTION INTRAMUSCULAR; INTRAVENOUS at 05:46

## 2020-06-12 RX ADMIN — POTASSIUM CHLORIDE SCH MEQ: 1500 TABLET, EXTENDED RELEASE ORAL at 09:35

## 2020-06-12 RX ADMIN — Medication SCH ML: at 22:45

## 2020-06-12 RX ADMIN — MORPHINE SULFATE PRN MG: 2 INJECTION, SOLUTION INTRAMUSCULAR; INTRAVENOUS at 17:57

## 2020-06-12 RX ADMIN — OXYCODONE AND ACETAMINOPHEN PRN TAB: 5; 325 TABLET ORAL at 09:36

## 2020-06-12 RX ADMIN — TAMSULOSIN HYDROCHLORIDE SCH MG: 0.4 CAPSULE ORAL at 09:35

## 2020-06-12 RX ADMIN — Medication SCH EACH: at 09:35

## 2020-06-12 RX ADMIN — Medication SCH ML: at 09:35

## 2020-06-12 RX ADMIN — INSULIN LISPRO SCH: 100 INJECTION, SOLUTION INTRAVENOUS; SUBCUTANEOUS at 07:48

## 2020-06-12 RX ADMIN — MORPHINE SULFATE PRN MG: 2 INJECTION, SOLUTION INTRAMUSCULAR; INTRAVENOUS at 12:38

## 2020-06-12 RX ADMIN — NICOTINE SCH MG: 14 PATCH TRANSDERMAL at 09:35

## 2020-06-12 RX ADMIN — OXYCODONE AND ACETAMINOPHEN PRN TAB: 5; 325 TABLET ORAL at 16:00

## 2020-06-12 RX ADMIN — INSULIN LISPRO SCH UNIT: 100 INJECTION, SOLUTION INTRAVENOUS; SUBCUTANEOUS at 22:44

## 2020-06-12 RX ADMIN — Medication SCH EACH: at 22:44

## 2020-06-12 RX ADMIN — FAMOTIDINE SCH MG: 20 TABLET ORAL at 22:43

## 2020-06-12 NOTE — PROGRESS NOTE
Assessment and Plan


Assessment and plan: 


Patient is 34 years old male with no significant past medical history except for

his recent admission to the hospital for perirectal abscess.  Patient was 

discharged from the hospital yesterday with a PICC line and an order for 

meropenem, Augmentin and doxycycline.  Patient brought to the emergency room 

from home for evaluation of fever and pain to the local area.  Patient had an 

I&D done by Dr. Chavez on June 1.  Patient denies any cough, shortness of 

breath, runny nose congestion.  Patient also denied any urinary symptoms.  

Sepsis protocol initiated and patient received a dose of meropenem, normal 

saline and, Tylenol and morphine.


Work up in ED revealed L medial pneumonia


Patient on appropriate abx.





6/12: Continue supportive care, discussed with Surgery, and case management, 

extensive conversation had with this patient about care and compliance.  Home 

health is also been arranged to assist with this at the group home that the 

patient is in.





(1) Left lower lobe pneumonia


Current Visit: Yes   Status: Acute   


Plan to address problem: 


Patient on appropriate abx


COnt Meropenem.


Reason for admitting is rule out COVId repeated pna


If Corona virus negative --Patient maybe discharged on same abx


Isolation till then








(2) Perirectal abscess


Current Visit: Yes   Status: Acute   


Plan to address problem: 


Cont Meropenem 


For IR for tODAY or further eval of the extent of wound








(3) IDDM (insulin dependent diabetes mellitus)


Current Visit: Yes   Status: Chronic   


Plan to address problem: 


Recent last A1c 7.1 a week ago


Cont Home Insulin and coverage








(4) BPH (benign prostatic hyperplasia)


Current Visit: Yes   Status: Chronic   


Qualifiers: 


   Lower urinary tract symptom presence: symptoms present 


Plan to address problem: 


Cont Flomax








(5) Malnutrition


Current Visit: Yes   Status: Acute   


Qualifiers: 


   Protein-calorie malnutrition severity: severe 


Plan to address problem: 


Albumin 2.1


Dietitian consult


Dietary supplements ordered





(6) Hypocalcemia


Current Visit: Yes   Status: Acute   


Plan to address problem: 


Supplemented





(7) DVT prophylaxis


Current Visit: No   Status: Acute   


Plan to address problem: 


On Lovenox and GI prophylaxis








History


Interval history: 


Patient seen and examined this morning, no new complaints.  He informs me that 

he lives in a group home.





Hospitalist Physical





- Physical exam


Narrative exam: 


General appearance: Present: no acute distress, well-nourished





- EENT


Eyes: PERRL, EOM intact


ENT: hearing intact, clear oral mucosa


Ears: bilateral: normal





- Neck


Neck: supple, normal ROM





- Respiratory


Respiratory effort: normal


Respiratory: bilateral: CTA





- Breasts


Breasts: normal





- Cardiovascular


Rhythm: regular


Heart Sounds: Present: S1 & S2.  Absent: gallop, rub


Extremities: pulses intact, No edema, normal color, Full ROM, abnormal (P 

dressing in perirectal area noted. )





- Gastrointestinal


General gastrointestinal: Present: soft, non-tender, non-distended, normal bowel

sounds





- Genitourinary


Male genitourinary: normal





- Integumentary


Integumentary: clear, warm, dry





- Musculoskeletal


Musculoskeletal: 1, strength equal bilaterally





- Neurologic





- Constitutional


Vitals: 


                                        











Temp Pulse Resp BP Pulse Ox


 


 99.1 F   97 H  18   84/55   94 


 


 06/12/20 11:54  06/12/20 11:54  06/12/20 11:54  06/12/20 11:54  06/12/20 11:54











General appearance: Present: no acute distress, well-nourished





Results





- Labs


CBC & Chem 7: 


                                 06/07/20 05:58





                                 06/07/20 05:58


Labs: 


                             Laboratory Last Values











WBC  5.0 K/mm3 (4.5-11.0)   06/07/20  05:58    


 


RBC  3.28 M/mm3 (3.65-5.03)  L  06/07/20  05:58    


 


Hgb  9.4 gm/dl (11.8-15.2)  L  06/07/20  05:58    


 


Hct  29.2 % (35.5-45.6)  L  06/07/20  05:58    


 


MCV  89 fl (84-94)   06/07/20  05:58    


 


MCH  29 pg (28-32)   06/07/20  05:58    


 


MCHC  32 % (32-34)   06/07/20  05:58    


 


RDW  18.5 % (13.2-15.2)  H  06/07/20  05:58    


 


Plt Count  257 K/mm3 (140-440)   06/07/20  05:58    


 


Lymph % (Auto)  28.0 % (13.4-35.0)   06/07/20  05:58    


 


Mono % (Auto)  15.7 % (0.0-7.3)  H  06/07/20  05:58    


 


Eos % (Auto)  0.6 % (0.0-4.3)   06/07/20  05:58    


 


Baso % (Auto)  0.2 % (0.0-1.8)   06/07/20  05:58    


 


Lymph #  1.4 K/mm3 (1.2-5.4)   06/07/20  05:58    


 


Mono #  0.8 K/mm3 (0.0-0.8)   06/07/20  05:58    


 


Eos #  0.0 K/mm3 (0.0-0.4)   06/07/20  05:58    


 


Baso #  0.0 K/mm3 (0.0-0.1)   06/07/20  05:58    


 


Add Manual Diff  Complete   06/06/20  17:58    


 


Total Counted  100   06/06/20  17:58    


 


Seg Neutrophils %  55.5 % (40.0-70.0)   06/07/20  05:58    


 


Seg Neuts % (Manual)  67.0 % (40.0-70.0)   06/06/20  17:58    


 


Band Neutrophils %  0 %  06/06/20  17:58    


 


Lymphocytes % (Manual)  21.0 % (13.4-35.0)   06/06/20  17:58    


 


Reactive Lymphs % (Man)  0 %  06/06/20  17:58    


 


Monocytes % (Manual)  11.0 % (0.0-7.3)  H  06/06/20  17:58    


 


Eosinophils % (Manual)  1.0 % (0.0-4.3)   06/06/20  17:58    


 


Basophils % (Manual)  0 % (0.0-1.8)   06/06/20  17:58    


 


Metamyelocytes %  0 %  06/06/20  17:58    


 


Myelocytes %  0 %  06/06/20  17:58    


 


Promyelocytes %  0 %  06/06/20  17:58    


 


Blast Cells %  0 %  06/06/20  17:58    


 


Nucleated RBC %  Not Reportable   06/06/20  17:58    


 


Seg Neutrophils #  2.8 K/mm3 (1.8-7.7)   06/07/20  05:58    


 


Seg Neutrophils # Man  3.1 K/mm3 (1.8-7.7)   06/06/20  17:58    


 


Band Neutrophils #  0.0 K/mm3  06/06/20  17:58    


 


Lymphocytes # (Manual)  1.0 K/mm3 (1.2-5.4)  L  06/06/20  17:58    


 


Abs React Lymphs (Man)  0.0 K/mm3  06/06/20  17:58    


 


Monocytes # (Manual)  0.5 K/mm3 (0.0-0.8)   06/06/20  17:58    


 


Eosinophils # (Manual)  0.0 K/mm3 (0.0-0.4)   06/06/20  17:58    


 


Basophils # (Manual)  0.0 K/mm3 (0.0-0.1)   06/06/20  17:58    


 


Metamyelocytes #  0.0 K/mm3  06/06/20  17:58    


 


Myelocytes #  0.0 K/mm3  06/06/20  17:58    


 


Promyelocytes #  0.0 K/mm3  06/06/20  17:58    


 


Blast Cells #  0.0 K/mm3  06/06/20  17:58    


 


WBC Morphology  Not Reportable   06/06/20  17:58    


 


Hypersegmented Neuts  Not Reportable   06/06/20  17:58    


 


Hyposegmented Neuts  Not Reportable   06/06/20  17:58    


 


Hypogranular Neuts  Not Reportable   06/06/20  17:58    


 


Smudge Cells  Not Reportable   06/06/20  17:58    


 


Toxic Granulation  Not Reportable   06/06/20  17:58    


 


Toxic Vacuolation  Not Reportable   06/06/20  17:58    


 


Dohle Bodies  Not Reportable   06/06/20  17:58    


 


Pelger-Huet Anomaly  Not Reportable   06/06/20  17:58    


 


Tammy Rods  Not Reportable   06/06/20  17:58    


 


Platelet Estimate  Consistent w auto   06/06/20  17:58    


 


Clumped Platelets  Not Reportable   06/06/20  17:58    


 


Plt Clumps, EDTA  Not Reportable   06/06/20  17:58    


 


Large Platelets  Not Reportable   06/06/20  17:58    


 


Giant Platelets  Not Reportable   06/06/20  17:58    


 


Platelet Satelliting  Not Reportable   06/06/20  17:58    


 


Plt Morphology Comment  Not Reportable   06/06/20  17:58    


 


RBC Morphology  Not Reportable   06/06/20  17:58    


 


Dimorphic RBCs  Not Reportable   06/06/20  17:58    


 


Polychromasia  Few   06/06/20  17:58    


 


Hypochromasia  Not Reportable   06/06/20  17:58    


 


Poikilocytosis  Not Reportable   06/06/20  17:58    


 


Anisocytosis  Not Reportable   06/06/20  17:58    


 


Microcytosis  1+   06/06/20  17:58    


 


Macrocytosis  Not Reportable   06/06/20  17:58    


 


Spherocytes  Not Reportable   06/06/20  17:58    


 


Pappenheimer Bodies  Not Reportable   06/06/20  17:58    


 


Sickle Cells  Not Reportable   06/06/20  17:58    


 


Target Cells  Not Reportable   06/06/20  17:58    


 


Tear Drop Cells  Not Reportable   06/06/20  17:58    


 


Ovalocytes  Not Reportable   06/06/20  17:58    


 


Stomatocytes  Few   06/06/20  17:58    


 


Helmet Cells  Not Reportable   06/06/20  17:58    


 


[place]-Sproul Bodies  Not Reportable   06/06/20  17:58    


 


Cabot Rings  Not Reportable   06/06/20  17:58    


 


Filipe Cells  Not Reportable   06/06/20  17:58    


 


Bite Cells  Not Reportable   06/06/20  17:58    


 


Crenated Cell  Not Reportable   06/06/20  17:58    


 


Elliptocytes  Not Reportable   06/06/20  17:58    


 


Acanthocytes (Spur)  Not Reportable   06/06/20  17:58    


 


Rouleaux  Not Reportable   06/06/20  17:58    


 


Hemoglobin C Crystals  Not Reportable   06/06/20  17:58    


 


Schistocytes  Not Reportable   06/06/20  17:58    


 


Malaria parasites  Not Reportable   06/06/20  17:58    


 


Jerardo Bodies  Not Reportable   06/06/20  17:58    


 


Hem Pathologist Commnt  No   06/06/20  17:58    


 


D-Dimer  597.75 ng/mlDDU (0-234)  H  06/06/20  18:58    


 


Sodium  143 mmol/L (137-145)   06/07/20  05:58    


 


Potassium  3.8 mmol/L (3.6-5.0)   06/07/20  05:58    


 


Chloride  103.9 mmol/L ()   06/07/20  05:58    


 


Carbon Dioxide  26 mmol/L (22-30)   06/07/20  05:58    


 


Anion Gap  17 mmol/L  06/07/20  05:58    


 


BUN  5 mg/dL (9-20)  L  06/07/20  05:58    


 


Creatinine  0.7 mg/dL (0.8-1.5)  L  06/07/20  05:58    


 


Estimated GFR  > 60 ml/min  06/07/20  05:58    


 


BUN/Creatinine Ratio  7 %  06/07/20  05:58    


 


Glucose  131 mg/dL ()  H  06/07/20  05:58    


 


POC Glucose  197  ()  H  06/12/20  12:09    


 


Lactic Acid  1.10 mmol/L (0.7-2.0)   06/06/20  17:58    


 


Calcium  8.1 mg/dL (8.4-10.2)  L  06/07/20  05:58    


 


Ferritin  80.4 ng/mL (13.0-400.0)   06/06/20  18:58    


 


Total Bilirubin  0.30 mg/dL (0.1-1.2)   06/07/20  05:58    


 


AST  10 units/L (5-40)   06/07/20  05:58    


 


ALT  8 units/L (7-56)   06/07/20  05:58    


 


Alkaline Phosphatase  306 units/L ()  H  06/07/20  05:58    


 


Lactate Dehydrogenase  155 units/L ()   06/06/20  18:58    


 


C-Reactive Protein  7.20 mg/dL (0.00-1.30)  H  06/06/20  18:58    


 


Total Protein  5.9 g/dL (6.3-8.2)  L  06/07/20  05:58    


 


Albumin  2.3 g/dL (3.9-5)  L  06/07/20  05:58    


 


Albumin/Globulin Ratio  0.6 %  06/07/20  05:58    


 


Procalcitonin  0.23 ng/mL (<0.15)   06/06/20  18:58    


 


Urine Color  Straw  (Yellow)   06/06/20  17:31    


 


Urine Turbidity  Clear  (Clear)   06/06/20  17:31    


 


Urine pH  8.0  (5.0-7.0)  H  06/06/20  17:31    


 


Ur Specific Gravity  1.006  (1.003-1.030)   06/06/20  17:31    


 


Urine Protein  <15 mg/dl mg/dL (Negative)   06/06/20  17:31    


 


Urine Glucose (UA)  >=500 mg/dL (Negative)   06/06/20  17:31    


 


Urine Ketones  Neg mg/dL (Negative)   06/06/20  17:31    


 


Urine Blood  Neg  (Negative)   06/06/20  17:31    


 


Urine Nitrite  Neg  (Negative)   06/06/20  17:31    


 


Urine Bilirubin  Neg  (Negative)   06/06/20  17:31    


 


Urine Urobilinogen  < 2.0 mg/dL (<2.0)   06/06/20  17:31    


 


Ur Leukocyte Esterase  Neg  (Negative)   06/06/20  17:31    


 


Urine WBC (Auto)  1.0 /HPF (0.0-6.0)   06/06/20  17:31    


 


Urine RBC (Auto)  2.0 /HPF (0.0-6.0)   06/06/20  17:31    


 


Nasal Screen MRSA (PCR)  Negative  (Negative)   06/07/20  02:25    


 


Coronavirus (PCR)  Negative  (Negative)   06/07/20  09:10    











Microbiology: 


Microbiology





06/06/20 17:58   Peripheral/Venous   Blood Culture - Final


                            NO GROWTH AFTER 5 DAYS


06/06/20 17:58   Peripheral/Venous   Blood Culture - Final


                            NO GROWTH AFTER 5 DAYS








Senior/IV: 


                                        





Voiding Method                   Urinal


IV Catheter Type [Right Upper    Mid-line


arm]                             











Active Medications





- Current Medications


Current Medications: 














Generic Name Dose Route Start Last Admin





  Trade Name Freq  PRN Reason Stop Dose Admin


 


Acetaminophen  650 mg  06/06/20 23:31  06/07/20 17:26





  Tylenol  PO   650 mg





  Q4H PRN   Administration





  Pain MILD(1-3)/Fever >100.5/HA  


 


Famotidine  20 mg  06/07/20 22:00  06/12/20 09:35





  Pepcid  PO   20 mg





  BID VIRGIL   Administration


 


Hydromorphone HCl  0.5 mg  06/06/20 23:34  06/11/20 12:08





  Dilaudid  IV   0.5 mg





  Q3H PRN   Administration





  Pain , Severe (7-10)  


 


MEROPENEM/NS 1 GRAM/100 ML  1 gram in 100 mls @ 100 mls/hr  06/09/20 14:00  

06/12/20 13:15





  Merrem/Ns 1 Gram/100 Ml  IV   100 mls/hr





  Q8HR VIRGIL   Administration


 


Sodium Chloride  1,000 mls @ 125 mls/hr  06/11/20 13:00  06/12/20 09:39





  Nacl 0.45% 1000 Ml  IV   125 mls/hr





  AS DIRECT VIRGIL   Administration


 


Ibuprofen  800 mg  06/06/20 23:31  06/10/20 12:21





  Ibuprofen  PO   800 mg





  Q8HR PRN   Administration





  Pain, Moderate (4-6)  


 


Insulin Glargine  40 units  06/09/20 15:17  06/11/20 23:19





  Lantus  SUB-Q   40 units





  QHS VIRGIL   Administration


 


Insulin Human Lispro  0 unit  06/09/20 11:30  06/12/20 12:37





  Humalog  SUB-Q   3 unit





  ACHS VIRGIL   Administration





  Protocol  


 


Metoclopramide HCl  10 mg  06/06/20 23:34 





  Reglan  IV  





  Q6H PRN  





  Nausea And Vomiting  


 


Miscellaneous Medication  750 mg  06/07/20 08:00 





  Balsalazide Disodium [Colazal]  PO  





  TID VIRGIL  


 


Miscellaneous Medication  300 mg  06/06/20 23:45 





  Ursodiol [Ursodiol]  PO  





  BID VIRGIL  


 


Morphine Sulfate  2 mg  06/11/20 12:30  06/12/20 12:38





  Morphine  IV   2 mg





  Q6H PRN   Administration





  Pain, Moderate (4-6)  


 


Multivitamins/Minerals  1 each  06/07/20 10:00  06/12/20 09:35





  Caltrate Plus  PO   1 each





  BID VIRGIL   Administration


 


Nicotine  14 mg  06/11/20 14:00  06/12/20 09:35





  Habitrol  TD   14 mg





  QDAY VIRGIL   Administration


 


Olanzapine  5 mg  06/07/20 10:00  06/12/20 09:35





  Zyprexa  PO   5 mg





  DAILY VIRGIL   Administration


 


Ondansetron HCl  4 mg  06/06/20 23:33 





  Zofran  IV  





  Q8H PRN  





  Nausea And Vomiting  


 


Oxycodone/Acetaminophen  1 tab  06/06/20 23:34  06/12/20 09:36





  Percocet 5/325  PO   1 tab





  Q6H PRN   Administration





  Pain, Moderate (4-6)  


 


Potassium Chloride  10 meq  06/06/20 23:45  06/12/20 09:35





  K-Dur  PO   10 meq





  BID VIRGIL   Administration


 


Sodium Chloride  10 ml  06/07/20 10:00  06/12/20 09:35





  Sodium Chloride Flush Syringe 10 Ml  IV   10 ml





  BID VIRGIL   Administration


 


Sodium Chloride  10 ml  06/06/20 23:33 





  Sodium Chloride Flush Syringe 10 Ml  IV  





  PRN PRN  





  LINE FLUSH  


 


Tamsulosin HCl  0.4 mg  06/07/20 10:00  06/12/20 09:35





  Flomax  PO   0.4 mg





  QDAY VIRGIL   Administration


 


Zinc Oxide  1 applic  06/12/20 11:00 





  Zinc Oxide  TP  





  BID VIRGIL  














Nutrition/Malnutrition Assess





- Dietary Evaluation


Nutrition/Malnutrition Findings: 


                                        





Nutrition Notes                                            Start:  06/07/20 

08:46


Freq:                                                      Status: Active       

 


Protocol:                                                                       

 


 Document     06/10/20 12:45  LP  (Rec: 06/10/20 12:52  LP  [id number])


 Nutrition Notes


     Initial or Follow up                        Reassessment


     Other Pertinent Diagnosis                   Fever, perirectal abscess,


                                                 Pneu, R/O COVID-19


     Current Diet                                Consistent CHO


     Labs/Tests                                  


     Pertinent Medications                       Reviewed


     Height                                      5 ft 8 in


     Weight                                      51.6 kg


     Ideal Body Weight (kg)                      70.00


     BMI                                         17.3


     Weight Status                               Underweight


     Subjective/Other Information                Pt consuming 100% of meals and


                                                 drinking supplements.


     Percent of energy/protein needs met:        100%/100%


     Burn                                        Absent


     Trauma                                      Absent


     GI Symptoms                                 None


     Minimum of two criteria                     No physical signs of


                                                 malnutrition


     #2


      Nutrition Diagnosis                        No nutrition diagnosis at this


                                                 time


     #1


      Nutrition Diagnosis                        Predicted suboptimal energy


                                                 intake


      As Evidenced by Signs and Symptoms         Pt consuming 100% of meals.


      Diagnosis Progress(for reassessment        Resolved


       documentation)                            


     Is patient on ventilator?                   No


     Is Patient Ambulatory and/or Out of Bed     Yes


     REE-([facility]-ambulatory/OOB) [     1866.150


      [id number]]                               


     Calculation Used for Recommendations        [facility]


     Additional Notes                            Protein needs are 56-67g (1-1.


                                                 2g/kg)


                                                 Fluid needs are 1ml/kcal


 Nutrition Intervention


     Change Diet Order:                          Continue


     Add Supplement/Snack (indicate name/kcal    Glucerna daily


      /protein )                                 


     Provides kCal:                              220


     Provides Protein (gm)                       10


     Goal #1                                     Meet at least 80% of kcal and


                                                 protein needs


     Anticipated Discharge Needs:                Consistent CHO diet and ONS as


                                                 needed


     Follow-Up By:                               06/16/20


     Additional Comments                         Follow for stable intakes

## 2020-06-12 NOTE — PROGRESS NOTE
Assessment and Plan





- Patient Problems


(1) Perirectal abscess


Current Visit: Yes   Status: Acute   


Plan to address problem: 


Pt stable. s/p EUA, washout, I&D (6/11) - POD#1.  No new infection was 

identified.  The air in the soft tissue at the base of the scrotum reveals no 

evidence of any infection.  The left buttock abnormality that was of concern on 

the new CT was simply a continuation of the original abscess.  There was no pus 

accumulated in that space.





The main treatment the patient requires at this time is to keep the area clean 

and dry.  The erythema on the skin is skin irritation from the accumulated 

moisture.  Despite my telling him multiple times, I do not think he comprehends 

the need to keep the area clean.  It appears as though he will require 

assistance with care once he is discharged.  Patient require daily dressing 

changes.  He needs to change the pads frequently through the day in order to 

keep the area dry.  Unless someone reminds him, I do not think this will happen.

 He may benefit from brief admission to a group home.





Wound care per the wound care nurse orders.





Discussed with Dr. Askew.








Subjective


Date of service: 06/12/20


Patient Reports: Positive: no new complaints, still having pain, other (has not 

changed the dressing this AM)





Objective


                               Vital Signs - 12hr











  06/11/20 06/12/20





  23:51 06:08


 


Temperature 98.7 F 98.4 F


 


Pulse Rate 121 H 98 H


 


Respiratory 24 16





Rate  


 


Blood Pressure 109/79 87/56


 


O2 Sat by Pulse 98 96





Oximetry  














- General physical appearance


no distress, no pain, other (looks better)





- Eyes


normal occular movement





- Respiratory


normal expansion, normal respiratory effort





- Integumentary


other (perineum is moist from accumulated discharge)





- Psychiatric


oriented to time, oriented to person, oriented to place, speech is normal, 

memory intact





- Labs





                                 06/07/20 05:58





                                 06/07/20 05:58

## 2020-06-12 NOTE — PROGRESS NOTE
Assessment and Plan





Cultures:


Blood culture 6/6/2020 pending





A/P: 33 yo M PMHx perirectal abscess admitted with fevers and pain in the 

perirectal area. 





#Acute sepsis: present with fevers and tachycardia. Unclear source, pneumonia vs

abscess recurrence. I am unclear as to why the patient was discharged on 

doxy/Augmentin as well as the meropenem as it is not in Dr. Srivastava's note, and 

much of the antibiotic spectrum is the same, and both drugs are clearly i

neffective against an ESBL. 





#Perirectal abscess: New abscess on gluteal fold next to perirectal fistula. 

continue meropenem.





#Pneumonia: small area of pneumonia, possible cause of fevers. However, would be

odd to develop on meropenem unless viral. Procal only mildly elevated. 





Recs:


-Given his poor compliance (likely through poor understanding) with wound care, 

some concern of how compliant he is with the IV antibiotics. 


-Continue meropenem, resume home health from previous admission on discharge.  

Case management consult placed to middle middle where in case pending needs 

today changed.


-Wound care per general surgery.





Thank you for the consult, we will continue to follow.





BRITT Maria MD


[facility], [place], operated by [facility] ([facility])


M: [phone]


O: [phone]


F: [phone]





Subjective


Date of service: 06/12/20


Principal diagnosis: Pneumonia


Interval history: 





No new issues.  Patient underwent wound exploration yesterday no new purulence 

was seen.





Objective





- Exam


Narrative Exam: 





Physical Exam: 


Constitutional: Alert, cooperative. No acute distress


Head, Ears, Nose: Normocephalic, atraumatic


Eyes: Conjunctivae/corneas clear.


Oral: dentition fair, no thrush


Cardiovascular: S1, S2 normal. 


Respiratory: Good air entry, clear to auscultation bilaterally


GI: Soft, non-tender; bowel sounds normal. No peritoneal signs. 


Musculoskeletal: No pedal edema, no cyanosis.


Skin: No rash or abscess


Hem/Lymphatic: No palpable cervical or supraclavicular nodes. 


Psych: Mood ok. Affect normal


Neurological: Awake, alert, oriented. No gross abnormality





- Constitutional


Vitals: 


                                   Vital Signs











Temp Pulse Resp BP Pulse Ox


 


 99.1 F   97 H  18   84/55   94 


 


 06/12/20 11:54  06/12/20 11:54  06/12/20 11:54  06/12/20 11:54  06/12/20 11:54








                           Temperature -Last 24 Hours











Temperature                    99.1 F


 


Temperature                    98.4 F


 


Temperature                    98.7 F


 


Temperature                    97.3 F


 


Temperature                    98.4 F


 


Temperature                    98.4 F


 


Temperature                    98.5 F


 


Temperature                    97.0 F

















- Labs


CBC & Chem 7: 


                                 06/07/20 05:58





                                 06/07/20 05:58


Labs: 


                              Abnormal lab results











  06/11/20 06/12/20 06/12/20 Range/Units





  21:51 07:41 12:09 


 


POC Glucose  201 H  132 H  197 H  ()

## 2020-06-13 VITALS — DIASTOLIC BLOOD PRESSURE: 63 MMHG | SYSTOLIC BLOOD PRESSURE: 96 MMHG

## 2020-06-13 RX ADMIN — MORPHINE SULFATE PRN MG: 2 INJECTION, SOLUTION INTRAMUSCULAR; INTRAVENOUS at 10:52

## 2020-06-13 RX ADMIN — Medication SCH EACH: at 10:23

## 2020-06-13 RX ADMIN — MORPHINE SULFATE PRN MG: 2 INJECTION, SOLUTION INTRAMUSCULAR; INTRAVENOUS at 05:49

## 2020-06-13 RX ADMIN — MEROPENEM SCH MLS/HR: 1 INJECTION, POWDER, FOR SOLUTION INTRAVENOUS at 05:44

## 2020-06-13 RX ADMIN — FAMOTIDINE SCH MG: 20 TABLET ORAL at 10:23

## 2020-06-13 RX ADMIN — HYDROMORPHONE HYDROCHLORIDE PRN MG: 1 INJECTION, SOLUTION INTRAMUSCULAR; INTRAVENOUS; SUBCUTANEOUS at 03:39

## 2020-06-13 RX ADMIN — TAMSULOSIN HYDROCHLORIDE SCH MG: 0.4 CAPSULE ORAL at 10:52

## 2020-06-13 RX ADMIN — NICOTINE SCH MG: 14 PATCH TRANSDERMAL at 10:23

## 2020-06-13 RX ADMIN — POTASSIUM CHLORIDE SCH MEQ: 1500 TABLET, EXTENDED RELEASE ORAL at 10:23

## 2020-06-13 RX ADMIN — INSULIN LISPRO SCH: 100 INJECTION, SOLUTION INTRAVENOUS; SUBCUTANEOUS at 08:22

## 2020-06-13 RX ADMIN — HYDROMORPHONE HYDROCHLORIDE PRN MG: 1 INJECTION, SOLUTION INTRAMUSCULAR; INTRAVENOUS; SUBCUTANEOUS at 08:29

## 2020-06-13 NOTE — DISCHARGE SUMMARY
Providers





- Providers


Date of Admission: 


06/06/20 18:42





Attending physician: 


CHERRI WHEELER MD





                                        





06/08/20 09:43


Consult to Physician [CONS] Routine 


   Comment: fever despite recent d/c with regulo for perirectal


   Consulting Provider: ISRA GONZALES


   Physician Instructions: 


   Reason For Exam: persistant fever





06/09/20 14:59


Consult to Physician [CONS] Routine 


   Comment: 


   Consulting Provider: RHIANNA CHAVEZ


   Physician Instructions: 


   Reason For Exam: Repeat abscess, known patient





06/10/20 09:55


Consult to Wound/ET Nurse [CONS] Routine 


   Reason For Exam: wound eval





06/12/20 09:01


Consult to Case Management [CONS] Routine 


   Services Needed at Discharge: 


   Notified:: cm


   Additional Physician Instructions: Needs placement.











Primary care physician: 


PRIMARY CARE MD








Hospitalization


Reason for admission: pneumonia


Condition: Stable


Hospital course: 


Patient is 34 years old male with no significant past medical history except for

 his recent admission to the hospital for perirectal abscess.  Patient was 

discharged from the hospital yesterday with a PICC line and an order for 

meropenem, Augmentin and doxycycline.  Patient brought to the emergency room 

from home for evaluation of fever and pain to the local area.  Patient had an 

I&D done by Dr. Chavez on June 1.  Patient denies any cough, shortness of 

breath, runny nose congestion.  Patient also denied any urinary symptoms.  

Sepsis protocol initiated and patient received a dose of meropenem, normal 

saline and, Tylenol and morphine.


Work up in ED revealed L medial pneumonia


Patient on appropriate abx.





6/12: Continue supportive care, discussed with Surgery, and case management, 

extensive conversation had with this patient about care and compliance.  Home 

health is also been arranged to assist with this at the group home that the 

patient is in.





6/13: Patient continues to clinically improve.  Case management arranged with 

home health to assist with management of wound.





(1) Left lower lobe pneumonia


Current Visit: Yes   Status: Acute   


Plan to address problem: 


Patient on appropriate abx


ruled out coronavirus





(2) Perirectal abscess


Current Visit: Yes   Status: Acute   


Plan to address problem: 


Cont Meropenem 


For IR for TODAY or further eval of the extent of wound








(3) IDDM (insulin dependent diabetes mellitus)


Current Visit: Yes   Status: Chronic   


Plan to address problem: 


Recent last A1c 7.1 a week ago


Cont Home Insulin and coverage








(4) BPH (benign prostatic hyperplasia)


Current Visit: Yes   Status: Chronic   


Qualifiers: 


   Lower urinary tract symptom presence: symptoms present 


Plan to address problem: 


Cont Flomax








(5) Malnutrition


Current Visit: Yes   Status: Acute   


Qualifiers: 


   Protein-calorie malnutrition severity: severe 


Plan to address problem: 


Albumin 2.1


Dietitian consult


Dietary supplements ordered





(6) Hypocalcemia


Current Visit: Yes   Status: Acute   


Plan to address problem: 


Supplemented














Disposition: DC/TX-06 HOME UNDER HOME HLTH


Time spent for discharge: 35 MINS





Core Measure Documentation





- Palliative Care


Palliative Care/ Comfort Measures: Not Applicable





- Core Measures


Any of the following diagnoses?: none





Exam





- Physical Exam


Narrative exam: 


General appearance: Present: no acute distress, well-nourished





- EENT


Eyes: PERRL, EOM intact


ENT: hearing intact, clear oral mucosa


Ears: bilateral: normal





- Neck


Neck: supple, normal ROM





- Respiratory


Respiratory effort: normal


Respiratory: bilateral: CTA





- Breasts


Breasts: normal





- Cardiovascular


Rhythm: regular


Heart Sounds: Present: S1 & S2.  Absent: gallop, rub


Extremities: pulses intact, No edema, normal color, Full ROM, abnormal (dressing

 in perirectal area noted. )





- Gastrointestinal


General gastrointestinal: Present: soft, non-tender, non-distended, normal bowel

 sounds





- Genitourinary


Male genitourinary: normal





- Integumentary


Integumentary: clear, warm, dry





- Musculoskeletal


Musculoskeletal: 1, strength equal bilaterally





- Neurologic





- Constitutional


Vitals: 


                                        











Temp Pulse Resp BP Pulse Ox


 


 97.3 F L  115 H  18   96/63   96 


 


 06/13/20 05:48  06/13/20 05:48  06/13/20 05:48  06/13/20 05:48  06/13/20 05:48














Plan


Activity: advance as tolerated, fall precautions


Diet: diabetic, no red dye, no straws, no carbonated beverages


Wound: per your surgeon's advice, per wound nurse instructions


Special Instructions: record daily weights, record daily BP diary, home health 

RN


Follow up with: 


PRIMARY CARE,MD [Primary Care Provider] - 3-5 Days


RHIANNA CHAVEZ MD [Staff Physician] - 7 Days


HEATHER GARCIA MD [Staff Physician] - 7 Days


[facility] [Outside] - 7 Days


Prescriptions: 


oxyCODONE /ACETAMINOPHEN [Percocet 5/325 mg] 1 tab PO Q6H PRN #14 tablet


 PRN Reason: Pain, Moderate (4-6)


Zinc Oxide 1 applic TP BID #7 tube

## 2020-06-13 NOTE — PROGRESS NOTE
Assessment and Plan





(1) Perirectal abscess


Current Visit: Yes   Status: Acute   


Plan to address problem: 


Pt stable. s/p EUA, washout, I&D (6/11) - POD#2.  No new infection was 

identified.  The air in the soft tissue at the base of the scrotum reveals no 

evidence of any infection.  The left buttock abnormality that was of concern on 

the new CT was simply a continuation of the original abscess.  There was no pus 

accumulated in that space.





Plan:





1. sitz baths multiple times per day - at least 4 times per day. Clean area with

wet wipes. Cover with ABD pad. Nursing to teach patient how to perform sitz 

baths


2. abx per 1' service


3. prn pain control


4. C already set up and they are to teach owner of group home where patient 

lives how to help with wound care


5. zinc oxide cream to area of diaper rash in groin


6. area must be kept clean and dry to ensure wound healing. Dr. Chavez has 

discussed this with patient multiple times and I have reinforced this today. 





Plan discussed with patient's RN who will provide supplies and sitz bath 

teaching. 


Discussed with Dr. Askew. Pt to be discharged to group home today. He may follow 

up with Dr. Chavez in 3-5 days and then in wound care clinic.





Thank you, please call with questions.








Subjective


Date of service: 06/13/20


Narrative: 





Pt seen and examined. States that the wounds are constantly leaking. He has been

doing wound care with the help of his nurses but is not sure if he can keep the 

area clean. No f/c.





Objective


                               Vital Signs - 12hr











  06/13/20





  05:48


 


Temperature 97.3 F L


 


Pulse Rate 115 H


 


Respiratory 18





Rate 


 


Blood Pressure 96/63


 


O2 Sat by Pulse 96





Oximetry 














- General physical appearance


Narrative Exam: 





Gen; AAOx3. NAD





- Labs





                                 06/07/20 05:58





                                 06/07/20 05:58

## 2020-07-01 ENCOUNTER — HOSPITAL ENCOUNTER (INPATIENT)
Dept: HOSPITAL 5 - ED | Age: 35
LOS: 4 days | Discharge: HOME HEALTH | DRG: 393 | End: 2020-07-05
Attending: INTERNAL MEDICINE | Admitting: INTERNAL MEDICINE
Payer: SELF-PAY

## 2020-07-01 DIAGNOSIS — Z88.2: ICD-10-CM

## 2020-07-01 DIAGNOSIS — E43: ICD-10-CM

## 2020-07-01 DIAGNOSIS — K60.4: Primary | ICD-10-CM

## 2020-07-01 DIAGNOSIS — E11.9: ICD-10-CM

## 2020-07-01 DIAGNOSIS — N40.0: ICD-10-CM

## 2020-07-01 DIAGNOSIS — Z79.4: ICD-10-CM

## 2020-07-01 LAB
ALBUMIN SERPL-MCNC: 3.4 G/DL (ref 3.9–5)
ALT SERPL-CCNC: 58 UNITS/L (ref 7–56)
BASOPHILS # (AUTO): 0 K/MM3 (ref 0–0.1)
BASOPHILS NFR BLD AUTO: 0.3 % (ref 0–1.8)
BUN SERPL-MCNC: 6 MG/DL (ref 9–20)
BUN/CREAT SERPL: 9 %
CALCIUM SERPL-MCNC: 9.1 MG/DL (ref 8.4–10.2)
EOSINOPHIL # BLD AUTO: 0.1 K/MM3 (ref 0–0.4)
EOSINOPHIL NFR BLD AUTO: 1.5 % (ref 0–4.3)
HCT VFR BLD CALC: 31.9 % (ref 35.5–45.6)
HEMOLYSIS INDEX: 0
HGB BLD-MCNC: 10.1 GM/DL (ref 11.8–15.2)
LYMPHOCYTES # BLD AUTO: 1.6 K/MM3 (ref 1.2–5.4)
LYMPHOCYTES NFR BLD AUTO: 26 % (ref 13.4–35)
MCHC RBC AUTO-ENTMCNC: 32 % (ref 32–34)
MCV RBC AUTO: 89 FL (ref 84–94)
MONOCYTES # (AUTO): 0.8 K/MM3 (ref 0–0.8)
MONOCYTES % (AUTO): 13.2 % (ref 0–7.3)
PLATELET # BLD: 326 K/MM3 (ref 140–440)
RBC # BLD AUTO: 3.57 M/MM3 (ref 3.65–5.03)

## 2020-07-01 PROCEDURE — 36415 COLL VENOUS BLD VENIPUNCTURE: CPT

## 2020-07-01 PROCEDURE — A6250 SKIN SEAL PROTECT MOISTURIZR: HCPCS

## 2020-07-01 PROCEDURE — 80053 COMPREHEN METABOLIC PANEL: CPT

## 2020-07-01 PROCEDURE — 81001 URINALYSIS AUTO W/SCOPE: CPT

## 2020-07-01 PROCEDURE — 85025 COMPLETE CBC W/AUTO DIFF WBC: CPT

## 2020-07-01 PROCEDURE — 87040 BLOOD CULTURE FOR BACTERIA: CPT

## 2020-07-01 PROCEDURE — 82962 GLUCOSE BLOOD TEST: CPT

## 2020-07-01 NOTE — EVENT NOTE
ED Screening Note


ED Screening Note: 





sp surgery for rectal abscess


ill appearing


dm





no fever





tachy








This initial assessment/diagnostic orders/clinical plan/treatment(s) is/are 

subject to change based on patients health status, clinical progression and re-

assessment by fellow clinical providers in the ED. Further treatment and workup 

at subsequent clinical providers discretion. Patient/guardian urged not to elope

from the ED as their condition may be serious if not clinically assessed and 

managed. 





Initial orders include: 


labs

## 2020-07-02 LAB
BILIRUB UR QL STRIP: (no result)
BLOOD UR QL VISUAL: (no result)
MUCOUS THREADS #/AREA URNS HPF: (no result) /HPF
PH UR STRIP: 5 [PH] (ref 5–7)
PROT UR STRIP-MCNC: (no result) MG/DL
RBC #/AREA URNS HPF: < 1 /HPF (ref 0–6)
UROBILINOGEN UR-MCNC: < 2 MG/DL (ref ?–2)
WBC #/AREA URNS HPF: 1 /HPF (ref 0–6)

## 2020-07-02 RX ADMIN — HYDROMORPHONE HYDROCHLORIDE PRN MG: 1 INJECTION, SOLUTION INTRAMUSCULAR; INTRAVENOUS; SUBCUTANEOUS at 05:15

## 2020-07-02 RX ADMIN — INSULIN HUMAN SCH UNITS: 100 INJECTION, SOLUTION PARENTERAL at 03:55

## 2020-07-02 RX ADMIN — INSULIN HUMAN SCH UNITS: 100 INJECTION, SOLUTION PARENTERAL at 15:20

## 2020-07-02 RX ADMIN — PIPERACILLIN AND TAZOBACTAM SCH MLS/HR: 4; .5 INJECTION, POWDER, FOR SOLUTION INTRAVENOUS at 17:22

## 2020-07-02 RX ADMIN — ZINC OXIDE SCH: 200 OINTMENT TOPICAL at 22:06

## 2020-07-02 RX ADMIN — INSULIN HUMAN SCH UNITS: 100 INJECTION, SOLUTION PARENTERAL at 22:29

## 2020-07-02 RX ADMIN — PIPERACILLIN AND TAZOBACTAM SCH MLS/HR: 4; .5 INJECTION, POWDER, FOR SOLUTION INTRAVENOUS at 05:14

## 2020-07-02 RX ADMIN — HYDROMORPHONE HYDROCHLORIDE PRN MG: 1 INJECTION, SOLUTION INTRAMUSCULAR; INTRAVENOUS; SUBCUTANEOUS at 16:31

## 2020-07-02 RX ADMIN — INSULIN HUMAN SCH UNITS: 100 INJECTION, SOLUTION PARENTERAL at 19:55

## 2020-07-02 RX ADMIN — ZINC OXIDE SCH APPLIC: 200 OINTMENT TOPICAL at 19:57

## 2020-07-02 RX ADMIN — HYDROMORPHONE HYDROCHLORIDE PRN MG: 1 INJECTION, SOLUTION INTRAMUSCULAR; INTRAVENOUS; SUBCUTANEOUS at 12:28

## 2020-07-02 RX ADMIN — PIPERACILLIN AND TAZOBACTAM SCH MLS/HR: 4; .5 INJECTION, POWDER, FOR SOLUTION INTRAVENOUS at 12:31

## 2020-07-02 RX ADMIN — PIPERACILLIN AND TAZOBACTAM SCH MLS/HR: 4; .5 INJECTION, POWDER, FOR SOLUTION INTRAVENOUS at 23:35

## 2020-07-02 RX ADMIN — OXYCODONE AND ACETAMINOPHEN PRN TAB: 5; 325 TABLET ORAL at 22:14

## 2020-07-02 RX ADMIN — INSULIN HUMAN SCH UNITS: 100 INJECTION, SOLUTION PARENTERAL at 12:39

## 2020-07-02 RX ADMIN — HYDROMORPHONE HYDROCHLORIDE PRN MG: 1 INJECTION, SOLUTION INTRAMUSCULAR; INTRAVENOUS; SUBCUTANEOUS at 20:02

## 2020-07-02 RX ADMIN — POTASSIUM CHLORIDE SCH MEQ: 1500 TABLET, EXTENDED RELEASE ORAL at 22:04

## 2020-07-02 RX ADMIN — INSULIN HUMAN SCH UNITS: 100 INJECTION, SOLUTION PARENTERAL at 06:31

## 2020-07-02 NOTE — HISTORY AND PHYSICAL REPORT
History of Present Illness


Date of examination: 07/02/20


Date of admission: 


07/02/20 02:03





Chief complaint: 





Rectal area pain


History of present illness: 





34 year old male presenting with rectal pain going on for some time. pain became

worse in the last 2 days . patient has procedures done to the rectal area in the

past and has a device put in the rectal area. There is no history of fever, 

chills , shortness of breath, nausea or vomiting. There is no rectal bleeding.





Past History


Past Medical History: diabetes, other (PERINEAL ABCESS)


Past Surgical History: Other (INCISION AND DRAINAGE OF PERINEAL ABCESS)


Social history: no significant social history


Family history: no significant family history





Medications and Allergies


                                    Allergies











Allergy/AdvReac Type Severity Reaction Status Date / Time


 


Sulfa (Sulfonamide Allergy  Rash Verified 05/31/20 19:19





Antibiotics)     











                                Home Medications











 Medication  Instructions  Recorded  Confirmed  Last Taken  Type


 


Balsalazide Disodium [Colazal] 750 mg PO TID 06/01/20 06/06/20 Unknown History


 


Insulin Detemir [Levemir VIAL] 25 unit SQ QHS 06/01/20 06/06/20 Unknown History


 


Insulin NPH/Regular [NovoLIN 70/30] 4 unit SUB-Q BID 06/01/20 06/06/20 Unknown 

History


 


OLANZapine [Zyprexa] 5 mg PO DAILY 06/01/20 06/06/20 Unknown History


 


Potassium Chloride [K-Dur] 10 meq PO BID 06/01/20 06/06/20 Unknown History


 


Tamsulosin [Flomax] 0.4 mg PO QDAY 06/01/20 06/06/20 Unknown History


 


ursodioL [Ursodiol] 300 mg PO BID 06/01/20 06/06/20 Unknown History


 


Ibuprofen [Motrin 800 MG tab] 800 mg PO Q8HR PRN #20 tablet 06/02/20 06/06/20 

Unknown Rx


 


Acetaminophen [Acetaminophen TAB] 650 mg PO Q4H PRN  tablet 06/05/20 06/06/20 

Unknown Rx


 


Zinc Oxide 1 applic TP BID #7 tube 06/13/20  Unknown Rx


 


oxyCODONE /ACETAMINOPHEN [Percocet 1 tab PO Q6H PRN #14 tablet 06/13/20  Unknown

 Rx





5/325 mg]     


 


oxyCODONE /ACETAMINOPHEN [Percocet 1 tab PO Q6HR PRN #15 tablet 06/23/20  

Unknown Rx





5/325]     











Active Meds: 


Active Medications





Acetaminophen (Tylenol)  650 mg PO Q4H PRN


   PRN Reason: Fever >101


Dextrose (D50w (25gm) Syringe)  50 ml IV Q30MIN PRN; Protocol


   PRN Reason: Hypoglycemia


Hydromorphone HCl (Dilaudid)  1 mg IV Q4H PRN


   PRN Reason: Pain , Severe (7-10)


Piperacillin Sod/Tazobactam Sod (Zosyn/Ns 4.5gm/100ml)  4.5 gm in 100 mls @ 200 

mls/hr IV Q6HR VIRGIL; Protocol


Insulin Human Regular (Humulin R)  0 units SUB-Q Q4H VIRGIL; Protocol


Ondansetron HCl (Zofran)  4 mg IV Q8H PRN


   PRN Reason: Nausea And Vomiting











Review of Systems


Constitutional: no weight loss, no weight gain, no fever, no chills, no sweats, 

no weakness


Eyes: bilateral: other (NO BILATERAL EYE SYMPTOMS)


Ears, nose, mouth and throat: no ear pain, no ear discharge, no decreased 

hearing, no nose pain, no nasal congestion, no nasal discharge, no sinus 

pressure, no dental pain, no mouth pain, no dysphagia, no headache


Cardiovascular: no chest pain, no orthopnea, no palpitations, no edema, no 

syncope, no lightheadedness, no shortness of breath, no claudication, no high 

blood pressure


Respiratory: no cough, no excessive sputum, no hemoptysis, no shortness of 

breath, no wheezing, no pain, no home oxygen


Gastrointestinal: no abdominal pain, no nausea, no vomiting, no change in bowel 

habits, no hematemesis, no melena, no hematochezia, no loss of appetite, no j

aundice


Genitourinary Male: no dysuria, no hematuria, no urinary frequency, no urinary 

hesitancy, no nocturia, no incontinence, no erectile dysfunction


Rectal: pain, discharge, no incontinence, no itching, no hemorrhoids


Musculoskeletal: no neck stiffness, no neck pain, no shooting arm pain, no arm 

numbness/tingling, no low back pain, no shooting leg pain, no morning stiffness,

no muscle weakness, no muscle cramps


Integumentary: no rash, no pruritis, no redness, no sores, no wounds, no 

jaundice, no darkening of skin, no depigmentation


Neurological: no paralysis, no weakness, no parathesias, no numbness, no 

tingling, no syncope, no tremors, no migraines, no convulsions, no aphasia, no 

change in speech, no change in mentation, no confusion, no memory loss


Psychiatric: no anxiety, no memory loss, no change in libido, no difficulties 

concentrating


Endocrine: no cold intolerance, no heat intolerance, no polyphagia, no 

polydipsia, no polyuria, no nocturia


Hematologic/Lymphatic: no easy bruising, no easy bleeding, no lymphadenopathy





Exam





- Constitutional


Vitals: 


                                        











Temp Pulse Resp BP Pulse Ox


 


 98.8 F   88   18   101/66   98 


 


 07/01/20 17:01  07/02/20 01:13  07/02/20 01:14  07/02/20 01:13  07/02/20 01:14











General appearance: Present: mild distress





- EENT


Eyes: Present: PERRL, EOM intact


ENT: hearing intact, clear oral mucosa





- Neck


Neck: Present: supple





- Respiratory


Respiratory effort: normal





- Cardiovascular


Rhythm: regular


Heart Sounds: Present: S1 & S2.  Absent: gallop, systolic murmur, diastolic 

murmur





- Extremities


Extremities: no ischemia, No edema


Peripheral Pulses: within normal limits





- Abdominal


General gastrointestinal: Present: soft, non-tender, non-distended.  Absent: 

tender, distended, rigid, hepatomegaly, splenomegaly


Male genitourinary: Present: deferred





- Rectal


Rectal Exam: other (TENDERNESS IN THE RECTAL AREA WITH PALPATION )





- Musculoskeletal


Musculoskeletal: strength equal bilaterally





- Psychiatric


Psychiatric: appropriate mood/affect





- Neurologic


Neurologic: CNII-XII intact





Results





- Labs


CBC & Chem 7: 


                                 07/01/20 18:53





                                 07/01/20 18:53


Labs: 


                             Laboratory Last Values











WBC  6.3 K/mm3 (4.5-11.0)   07/01/20  18:53    


 


RBC  3.57 M/mm3 (3.65-5.03)  L  07/01/20  18:53    


 


Hgb  10.1 gm/dl (11.8-15.2)  L  07/01/20  18:53    


 


Hct  31.9 % (35.5-45.6)  L  07/01/20  18:53    


 


MCV  89 fl (84-94)   07/01/20  18:53    


 


MCH  28 pg (28-32)   07/01/20  18:53    


 


MCHC  32 % (32-34)   07/01/20  18:53    


 


RDW  17.1 % (13.2-15.2)  H  07/01/20  18:53    


 


Plt Count  326 K/mm3 (140-440)   07/01/20  18:53    


 


Lymph % (Auto)  26.0 % (13.4-35.0)   07/01/20  18:53    


 


Mono % (Auto)  13.2 % (0.0-7.3)  H  07/01/20  18:53    


 


Eos % (Auto)  1.5 % (0.0-4.3)   07/01/20  18:53    


 


Baso % (Auto)  0.3 % (0.0-1.8)   07/01/20  18:53    


 


Lymph #  1.6 K/mm3 (1.2-5.4)   07/01/20  18:53    


 


Mono #  0.8 K/mm3 (0.0-0.8)   07/01/20  18:53    


 


Eos #  0.1 K/mm3 (0.0-0.4)   07/01/20  18:53    


 


Baso #  0.0 K/mm3 (0.0-0.1)   07/01/20  18:53    


 


Seg Neutrophils %  59.0 % (40.0-70.0)   07/01/20  18:53    


 


Seg Neutrophils #  3.7 K/mm3 (1.8-7.7)   07/01/20  18:53    


 


Sodium  135 mmol/L (137-145)  L  07/01/20  18:53    


 


Potassium  3.9 mmol/L (3.6-5.0)   07/01/20  18:53    


 


Chloride  95.3 mmol/L ()  L  07/01/20  18:53    


 


Carbon Dioxide  28 mmol/L (22-30)   07/01/20  18:53    


 


Anion Gap  16 mmol/L  07/01/20  18:53    


 


BUN  6 mg/dL (9-20)  L  07/01/20  18:53    


 


Creatinine  0.7 mg/dL (0.8-1.5)  L  07/01/20  18:53    


 


Estimated GFR  > 60 ml/min  07/01/20  18:53    


 


BUN/Creatinine Ratio  9 %  07/01/20  18:53    


 


Glucose  384 mg/dL ()  H  07/01/20  18:53    


 


Calcium  9.1 mg/dL (8.4-10.2)   07/01/20  18:53    


 


Total Bilirubin  0.30 mg/dL (0.1-1.2)   07/01/20  18:53    


 


AST  54 units/L (5-40)  H  07/01/20  18:53    


 


ALT  58 units/L (7-56)  H  07/01/20  18:53    


 


Alkaline Phosphatase  636 units/L ()  H  07/01/20  18:53    


 


Total Protein  7.4 g/dL (6.3-8.2)   07/01/20  18:53    


 


Albumin  3.4 g/dL (3.9-5)  L  07/01/20  18:53    


 


Albumin/Globulin Ratio  0.9 %  07/01/20  18:53    











Microbiology: 


Microbiology





07/01/20 18:53   Peripheral/Venous   Blood Culture - Preliminary


                            Culture in Progress


07/01/20 18:58   Peripheral/Venous   Blood Culture - Preliminary


                            Culture in Progress











Assessment and Plan





- Patient Problems


(1) Perirectal abscess


Current Visit: Yes   Status: Acute   


Plan to address problem: 


1. SURGICAL CONSULT WITH ON CALL SURGEON


 2. 1.V ZOSYN ANTIBIOTICS


 3. I.V DILUDID FOR PAIN


 4. TABLET TYLENOL FOR FEVER 


 5. I.V ZOFRAN  FOR NAUSEA AND VOMITING








(2) Rectal fistula


Current Visit: Yes   Status: Acute   


Plan to address problem: 


SURGICAL CONSULT WITH ON CALL SURGEON.

## 2020-07-02 NOTE — CONSULTATION
History of Present Illness


Consult date: 07/02/20


Reason for consult: wound care


Requesting physician: RADHA VELASQUEZ III


Chief complaint: 





perianal pain





- History of present illness


History of present illness: 





33yo M who is well-known to our service for having a complicated perirectal 

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 

the perineal area.  Patient reports that he was doing the sitz bath's twice a 

day and changing the dressings 2-3 times a day.  Denies any fevers, chills, 

nausea, vomiting.  We are asked to evaluate.





Past History


Past Medical History: diabetes, other (PERINEAL ABCESS)


Past Surgical History: Other (INCISION AND DRAINAGE OF PERINEAL ABCESS)


Social history: no significant social history


Family history: no significant family history





Medications and Allergies


                                    Allergies











Allergy/AdvReac Type Severity Reaction Status Date / Time


 


Sulfa (Sulfonamide Allergy  Rash Verified 05/31/20 19:19





Antibiotics)     











                                Home Medications











 Medication  Instructions  Recorded  Confirmed  Last Taken  Type


 


Balsalazide Disodium [Colazal] 750 mg PO TID 06/01/20 07/02/20 Unknown History


 


Insulin Detemir [Levemir VIAL] 25 unit SQ QHS 06/01/20 07/02/20 1 Day Ago 

History





    ~07/01/20 


 


Insulin NPH/Regular [NovoLIN 70/30] 4 unit SUB-Q BID 06/01/20 07/02/20 Unknown 

History


 


OLANZapine [Zyprexa] 5 mg PO DAILY 06/01/20 07/02/20 Unknown History


 


Potassium Chloride [K-Dur] 10 meq PO BID 06/01/20 07/02/20 Unknown History


 


Tamsulosin [Flomax] 0.4 mg PO QDAY 06/01/20 07/02/20 Unknown History


 


ursodioL [Ursodiol] 300 mg PO BID 06/01/20 07/02/20 Unknown History


 


Ibuprofen [Motrin 800 MG tab] 800 mg PO Q8HR PRN #20 tablet 06/02/20 07/02/20 

Unknown Rx


 


Acetaminophen [Acetaminophen TAB] 650 mg PO Q4H PRN  tablet 06/05/20 07/02/20 

Unknown Rx


 


Zinc Oxide 1 applic TP BID #7 tube 06/13/20 07/02/20 Unknown Rx


 


oxyCODONE /ACETAMINOPHEN [Percocet 1 tab PO Q6H PRN #14 tablet 06/13/20 07/02/20

 Unknown Rx





5/325 mg]     


 


oxyCODONE /ACETAMINOPHEN [Percocet 1 tab PO Q6HR PRN #15 tablet 06/23/20 07/02/20 Unknown Rx





5/325]     











Active Meds: 


Active Medications





Acetaminophen (Tylenol)  650 mg PO Q4H PRN


   PRN Reason: Fever >101


Dextrose (D50w (25gm) Syringe)  0 ml IV Q30MIN PRN; Protocol


   PRN Reason: Hypoglycemia


Hydromorphone HCl (Dilaudid)  1 mg IV Q4H PRN


   PRN Reason: Pain , Severe (7-10)


   Last Admin: 07/02/20 05:15 Dose:  1 mg


   Documented by: 


Piperacillin Sod/Tazobactam Sod (Zosyn/Ns 4.5gm/100ml)  4.5 gm in 100 mls @ 200 

mls/hr IV Q6HR VIRGIL; Protocol


   Last Admin: 07/02/20 05:14 Dose:  200 mls/hr


   Documented by: 


Insulin Human Regular (Humulin R)  0 units SUB-Q Q4H VIRGIL; Protocol


   Last Admin: 07/02/20 06:31 Dose:  2 units


   Documented by: 


Ondansetron HCl (Zofran)  4 mg IV Q8H PRN


   PRN Reason: Nausea And Vomiting











Review of Systems





- Constitutional


chronic pain, no fever, no chills





- Cardiovascular


no chest pain, no shortness of breath





- Respiratory


no cough





- Gastrointestinal


no abdominal pain, no nausea, no vomiting





- Integumentary


wounds





Exam


                                   Vital Signs











Temp Pulse Resp BP Pulse Ox


 


 98.8 F   109 H  20   98/66   98 


 


 07/01/20 17:01  07/01/20 17:01  07/01/20 17:01  07/01/20 17:01  07/01/20 17:01














- General physical appearance


Positive: no distress, no pain





- Eyes


Positive: normal occular movement





- Respiratory


Positive: normal expansion, normal respiratory effort, clear to auscultation





- Cardiovascular


Rhythm: regular





- Abdomen


Abdomen: Present: soft





- Rectum


Rectum: other (No signs of erythema or purulent drainage. Penrose drain in 

place. Seton in place. Right buttock wound is clean. Drain came out previously. 

Pt sitting in a large amount of liquid stool. )





- Neurologic


Neurologic: alert and oriented to time, place and person





- Psychiatric


Psychiatric: cooperative, other (Does not appear to have appropriate judgment or

insight)





Results





- Labs





                                 07/01/20 18:53





                                 07/01/20 18:53


                              Abnormal lab results











  07/01/20 07/01/20 07/02/20 Range/Units





  18:53 18:53 04:06 


 


RBC  3.57 L    (3.65-5.03)  M/mm3


 


Hgb  10.1 L    (11.8-15.2)  gm/dl


 


Hct  31.9 L    (35.5-45.6)  %


 


RDW  17.1 H    (13.2-15.2)  %


 


Mono % (Auto)  13.2 H    (0.0-7.3)  %


 


Sodium   135 L   (137-145)  mmol/L


 


Chloride   95.3 L   ()  mmol/L


 


BUN   6 L   (9-20)  mg/dL


 


Creatinine   0.7 L   (0.8-1.5)  mg/dL


 


Glucose   384 H   ()  mg/dL


 


POC Glucose    282 H  ()  


 


AST   54 H   (5-40)  units/L


 


ALT   58 H   (7-56)  units/L


 


Alkaline Phosphatase   636 H   ()  units/L


 


Albumin   3.4 L   (3.9-5)  g/dL














  07/02/20 Range/Units





  06:25 


 


RBC   (3.65-5.03)  M/mm3


 


Hgb   (11.8-15.2)  gm/dl


 


Hct   (35.5-45.6)  %


 


RDW   (13.2-15.2)  %


 


Mono % (Auto)   (0.0-7.3)  %


 


Sodium   (137-145)  mmol/L


 


Chloride   ()  mmol/L


 


BUN   (9-20)  mg/dL


 


Creatinine   (0.8-1.5)  mg/dL


 


Glucose   ()  mg/dL


 


POC Glucose  228 H  ()  


 


AST   (5-40)  units/L


 


ALT   (7-56)  units/L


 


Alkaline Phosphatase   ()  units/L


 


Albumin   (3.9-5)  g/dL








                                 Diabetes panel











  07/01/20 Range/Units





  18:53 


 


Sodium  135 L  (137-145)  mmol/L


 


Potassium  3.9  (3.6-5.0)  mmol/L


 


Chloride  95.3 L  ()  mmol/L


 


Carbon Dioxide  28  (22-30)  mmol/L


 


BUN  6 L  (9-20)  mg/dL


 


Creatinine  0.7 L  (0.8-1.5)  mg/dL


 


Glucose  384 H  ()  mg/dL


 


Calcium  9.1  (8.4-10.2)  mg/dL


 


AST  54 H  (5-40)  units/L


 


ALT  58 H  (7-56)  units/L


 


Alkaline Phosphatase  636 H  ()  units/L


 


Total Protein  7.4  (6.3-8.2)  g/dL


 


Albumin  3.4 L  (3.9-5)  g/dL








                                  Calcium panel











  07/01/20 Range/Units





  18:53 


 


Calcium  9.1  (8.4-10.2)  mg/dL


 


Albumin  3.4 L  (3.9-5)  g/dL








                                 Pituitary panel











  07/01/20 Range/Units





  18:53 


 


Sodium  135 L  (137-145)  mmol/L


 


Potassium  3.9  (3.6-5.0)  mmol/L


 


Chloride  95.3 L  ()  mmol/L


 


Carbon Dioxide  28  (22-30)  mmol/L


 


BUN  6 L  (9-20)  mg/dL


 


Creatinine  0.7 L  (0.8-1.5)  mg/dL


 


Glucose  384 H  ()  mg/dL


 


Calcium  9.1  (8.4-10.2)  mg/dL








                                  Adrenal panel











  07/01/20 Range/Units





  18:53 


 


Sodium  135 L  (137-145)  mmol/L


 


Potassium  3.9  (3.6-5.0)  mmol/L


 


Chloride  95.3 L  ()  mmol/L


 


Carbon Dioxide  28  (22-30)  mmol/L


 


BUN  6 L  (9-20)  mg/dL


 


Creatinine  0.7 L  (0.8-1.5)  mg/dL


 


Glucose  384 H  ()  mg/dL


 


Calcium  9.1  (8.4-10.2)  mg/dL


 


Total Bilirubin  0.30  (0.1-1.2)  mg/dL


 


AST  54 H  (5-40)  units/L


 


ALT  58 H  (7-56)  units/L


 


Alkaline Phosphatase  636 H  ()  units/L


 


Total Protein  7.4  (6.3-8.2)  g/dL


 


Albumin  3.4 L  (3.9-5)  g/dL














Assessment and Plan





- Patient Problems


(1) Rectal fistula


Current Visit: Yes   Status: Acute   


Plan to address problem: 


Patient is stable.  There do not appear to be any new issues or new infections. 

The main problem continues to be that he is not caring for his wounds as he 

needs to.  After having interacted with him multiple times now, I think the pro

blem may be that he does not have the mental capacity to do what is necessary to

take care of himself.  Unfortunately, I think the best thing at this point may 

be to do a diverting colostomy to allow the perianal/rectal wounds to heal.  

Once that happens, then the colostomy can be reversed in the future.  This will 

require a number of months.





I discussed this possible plan with him.  He went back and forth and ultimately 

said he did not want to proceed.  I discussed the situation with his case 

manager (Carmela Lambert - [phone]) at great length.  As we do not have an 

emergency, infection, urgent issue, etc., I am a bit torn to move forward with a

colostomy when he does not want it.  I do feel it is ultimately in his best 

interest as he cannot adequately take care of himself at this time.  Ms. Lambert 

will talk to her team and then get back to me on what they advise.  In the 

meantime, I will try Imodium to see if I can slow down the liquid stool.  

Perhaps if the drainage is minimal, he may be able to manage it without the 

diverting colostomy.  I am not confident that this will be enough.





Routine wound care at this point.  We will follow along.  Will advise once we 

get further guidance from the state .





Please call with any questions





Time=60min

## 2020-07-02 NOTE — EMERGENCY DEPARTMENT REPORT
ED General Adult HPI





- General


Chief complaint: Rectal Pain


Stated complaint: PAIN


PUI?: No


Time Seen by Provider: 07/01/20 17:02


Source: patient


Mode of arrival: Wheelchair


Limitations: No Limitations





- History of Present Illness


Initial comments: 





Patient is a 34-year-old male that presents emergency room with complaints of 

rectal pain.  Patient states he has had multiple procedures to his rectal area 

and has a drain in place.  Patient states 2 days ago that the pain increased and

he started leaking stool from the Penrose drain is in place.  Patient denies 

fever.  Patient denies chills.  Patient states the pain is a 10 out of 10.  

Patient states his pain is worsening.  Patient dates the pain is worse with 

moving and walking.  Pain states the pain is better with rest.


-: Sudden


Location: buttocks


Radiation: non-radiation


Severity scale (0 -10): 10


Quality: stabbing


Consistency: constant


Improves with: rest


Worsens with: movement, other


Associated Symptoms: denies: confusion, chest pain, cough, diaphoresis, 

fever/chills, headaches, loss of appetite, malaise, nausea/vomiting, rash, 

seizure, shortness of breath, syncope, weakness


Treatments Prior to Arrival: none





- Related Data


                                Home Medications











 Medication  Instructions  Recorded  Confirmed  Last Taken


 


Balsalazide Disodium [Colazal] 750 mg PO TID 06/01/20 06/06/20 Unknown


 


Insulin Detemir [Levemir VIAL] 25 unit SQ QHS 06/01/20 06/06/20 Unknown


 


Insulin NPH/Regular [NovoLIN 70/30] 4 unit SUB-Q BID 06/01/20 06/06/20 Unknown


 


OLANZapine [Zyprexa] 5 mg PO DAILY 06/01/20 06/06/20 Unknown


 


Potassium Chloride [K-Dur] 10 meq PO BID 06/01/20 06/06/20 Unknown


 


Tamsulosin [Flomax] 0.4 mg PO QDAY 06/01/20 06/06/20 Unknown


 


ursodioL [Ursodiol] 300 mg PO BID 06/01/20 06/06/20 Unknown








                                  Previous Rx's











 Medication  Instructions  Recorded  Last Taken  Type


 


Ibuprofen [Motrin 800 MG tab] 800 mg PO Q8HR PRN #20 tablet 06/02/20 Unknown Rx


 


Acetaminophen [Acetaminophen TAB] 650 mg PO Q4H PRN  tablet 06/05/20 Unknown Rx


 


Zinc Oxide 1 applic TP BID #7 tube 06/13/20 Unknown Rx


 


oxyCODONE /ACETAMINOPHEN [Percocet 1 tab PO Q6H PRN #14 tablet 06/13/20 Unknown 

Rx





5/325 mg]    


 


oxyCODONE /ACETAMINOPHEN [Percocet 1 tab PO Q6HR PRN #15 tablet 06/23/20 Unknown

 Rx





5/325]    











                                    Allergies











Allergy/AdvReac Type Severity Reaction Status Date / Time


 


Sulfa (Sulfonamide Allergy  Rash Verified 05/31/20 19:19





Antibiotics)     














ED Review of Systems


ROS: 


Stated complaint: PAIN


Other details as noted in HPI





Comment: All other systems reviewed and negative





ED Past Medical Hx





- Past Medical History


Previous Medical History?: Yes


Hx Hypertension: No


Hx Heart Attack/AMI: No


Hx Congestive Heart Failure: No


Hx Diabetes: Yes


Hx Deep Vein Thrombosis: No


Hx Pulmonary Embolism: No


Hx Liver Disease: No


Hx Renal Disease: No


Hx Sickle Cell Disease: No


Hx Arthritis: No


Hx Seizures: No


Hx Asthma: No


Hx COPD: No


Hx HIV: No


Additional medical history: Left perianal abscess





- Surgical History


Past Surgical History?: Yes


Hx Pacemaker: No


Hx Internal Defibrillator: No


Hx Cholecystectomy: No


Hx Appendectomy: No


Additional Surgical History: Incision and drainage perianal abscess





- Family History


Family history: no significant





- Social History


Smoking Status: Never Smoker


Substance Use Type: None





- Medications


Home Medications: 


                                Home Medications











 Medication  Instructions  Recorded  Confirmed  Last Taken  Type


 


Balsalazide Disodium [Colazal] 750 mg PO TID 06/01/20 06/06/20 Unknown History


 


Insulin Detemir [Levemir VIAL] 25 unit SQ QHS 06/01/20 06/06/20 Unknown History


 


Insulin NPH/Regular [NovoLIN 70/30] 4 unit SUB-Q BID 06/01/20 06/06/20 Unknown 

History


 


OLANZapine [Zyprexa] 5 mg PO DAILY 06/01/20 06/06/20 Unknown History


 


Potassium Chloride [K-Dur] 10 meq PO BID 06/01/20 06/06/20 Unknown History


 


Tamsulosin [Flomax] 0.4 mg PO QDAY 06/01/20 06/06/20 Unknown History


 


ursodioL [Ursodiol] 300 mg PO BID 06/01/20 06/06/20 Unknown History


 


Ibuprofen [Motrin 800 MG tab] 800 mg PO Q8HR PRN #20 tablet 06/02/20 06/06/20 

Unknown Rx


 


Acetaminophen [Acetaminophen TAB] 650 mg PO Q4H PRN  tablet 06/05/20 06/06/20 

Unknown Rx


 


Zinc Oxide 1 applic TP BID #7 tube 06/13/20  Unknown Rx


 


oxyCODONE /ACETAMINOPHEN [Percocet 1 tab PO Q6H PRN #14 tablet 06/13/20  Unknown

 Rx





5/325 mg]     


 


oxyCODONE /ACETAMINOPHEN [Percocet 1 tab PO Q6HR PRN #15 tablet 06/23/20  

Unknown Rx





5/325]     














ED Physical Exam





- General


Limitations: No Limitations


General appearance: alert, in no apparent distress





- Head


Head exam: Present: atraumatic, normocephalic





- Eye


Eye exam: Present: normal appearance





- ENT


ENT exam: Present: mucous membranes moist





- Neck


Neck exam: Present: normal inspection





- Respiratory


Respiratory exam: Present: normal lung sounds bilaterally.  Absent: respiratory 

distress





- Cardiovascular


Cardiovascular Exam: Present: regular rate, normal rhythm.  Absent: systolic m

urmur, diastolic murmur, rubs, gallop





- GI/Abdominal


GI/Abdominal exam: Present: soft, normal bowel sounds.  Absent: distended, 

tenderness, guarding





- Rectal


Rectal exam: Present: other (Penrose drain in place.  Copious amounts of stool 

coming from the surgical wound and drain.)





- Extremities Exam


Extremities exam: Present: normal inspection





- Back Exam


Back exam: Present: normal inspection





- Neurological Exam


Neurological exam: Present: alert, oriented X3





- Psychiatric


Psychiatric exam: Present: normal affect, normal mood





- Skin


Skin exam: Present: warm, dry, intact, normal color.  Absent: rash





ED Course


                                   Vital Signs











  07/01/20 07/02/20 07/02/20





  17:01 01:13 01:14


 


Temperature 98.8 F  


 


Pulse Rate 109 H 88 


 


Respiratory 20 18 18





Rate   


 


Blood Pressure 98/66  


 


Blood Pressure  101/66 





[Left]   


 


O2 Sat by Pulse 98 98 98





Oximetry   














- Reevaluation(s)


Reevaluation #1: 


I discussed all results with patient.  I discussed plan of care with patient.  

Patient agrees with plan of care and admission.  Patient to be admitted to the 

hospitalist service.


07/02/20 01:26








- Consultations


Consultation #1: 


General surgery paged


07/02/20 00:30


I discussed with Dr. Styles, general surgery.  Dr. Styles does not recommend any

 further imaging and recommends admission to the hospitalist service.


07/02/20 01:26





Consultation #2: 


Hospitalist consulted for admission.  Hospitalist to admit patient.


07/02/20 01:31





ED Medical Decision Making





- Lab Data


Result diagrams: 


                                 07/01/20 18:53





                                 07/01/20 18:53





- Medical Decision Making





Patient is a 34-year-old male that presents emergency room with rectal pain.  

Patient has a long history of a perirectal abscess and a rectal fistula.  

Patient has an open wound with a Penrose drain in it.  Patient has stool coming 

from the Penrose drain.  Patient given Dilaudid for pain.  Patient given Zosyn 

for broad-spectrum antibiotic.  General surgery was consulted and they recommend

 admission and no further imaging in the ER.  Patient admitted to the 

hospitalist service.





- Differential Diagnosis


Rectal fistula, perirectal abscess, rectal pain


Critical Care Time: Yes


Critical care time in (mins) excluding proc time.: 35


Critical care attestation.: 


If time is entered above; I have spent that time in minutes in the direct care 

of this critically ill patient, excluding procedure time.





Critical Care Time: 





35 MINUTES





ED Disposition


Clinical Impression: 


 Perirectal abscess, Open wound of perineum, Rectal fistula





Disposition: [id number] OP ADMIT IP TO THIS HOSP


Is pt being admited?: Yes


Does the pt Need Aspirin: No


Condition: Critical


Time of Disposition: 01:29

## 2020-07-03 RX ADMIN — INSULIN HUMAN SCH UNITS: 100 INJECTION, SOLUTION PARENTERAL at 23:21

## 2020-07-03 RX ADMIN — PIPERACILLIN AND TAZOBACTAM SCH MLS/HR: 4; .5 INJECTION, POWDER, FOR SOLUTION INTRAVENOUS at 06:21

## 2020-07-03 RX ADMIN — HYDROMORPHONE HYDROCHLORIDE PRN MG: 1 INJECTION, SOLUTION INTRAMUSCULAR; INTRAVENOUS; SUBCUTANEOUS at 12:27

## 2020-07-03 RX ADMIN — INSULIN HUMAN SCH UNITS: 100 INJECTION, SOLUTION PARENTERAL at 16:24

## 2020-07-03 RX ADMIN — PIPERACILLIN AND TAZOBACTAM SCH MLS/HR: 4; .5 INJECTION, POWDER, FOR SOLUTION INTRAVENOUS at 12:27

## 2020-07-03 RX ADMIN — PIPERACILLIN AND TAZOBACTAM SCH MLS/HR: 4; .5 INJECTION, POWDER, FOR SOLUTION INTRAVENOUS at 23:21

## 2020-07-03 RX ADMIN — PIPERACILLIN AND TAZOBACTAM SCH MLS/HR: 4; .5 INJECTION, POWDER, FOR SOLUTION INTRAVENOUS at 18:07

## 2020-07-03 RX ADMIN — TAMSULOSIN HYDROCHLORIDE SCH MG: 0.4 CAPSULE ORAL at 10:08

## 2020-07-03 RX ADMIN — HYDROMORPHONE HYDROCHLORIDE PRN MG: 1 INJECTION, SOLUTION INTRAMUSCULAR; INTRAVENOUS; SUBCUTANEOUS at 15:04

## 2020-07-03 RX ADMIN — OXYCODONE AND ACETAMINOPHEN PRN TAB: 5; 325 TABLET ORAL at 23:21

## 2020-07-03 RX ADMIN — ZINC OXIDE SCH APPLIC: 200 OINTMENT TOPICAL at 21:11

## 2020-07-03 RX ADMIN — POTASSIUM CHLORIDE SCH MEQ: 1500 TABLET, EXTENDED RELEASE ORAL at 21:10

## 2020-07-03 RX ADMIN — POTASSIUM CHLORIDE SCH MEQ: 1500 TABLET, EXTENDED RELEASE ORAL at 10:08

## 2020-07-03 RX ADMIN — OXYCODONE AND ACETAMINOPHEN PRN TAB: 5; 325 TABLET ORAL at 18:06

## 2020-07-03 RX ADMIN — HYDROMORPHONE HYDROCHLORIDE PRN MG: 1 INJECTION, SOLUTION INTRAMUSCULAR; INTRAVENOUS; SUBCUTANEOUS at 06:24

## 2020-07-03 RX ADMIN — INSULIN HUMAN SCH UNITS: 100 INJECTION, SOLUTION PARENTERAL at 03:34

## 2020-07-03 RX ADMIN — INSULIN HUMAN SCH UNITS: 100 INJECTION, SOLUTION PARENTERAL at 12:29

## 2020-07-03 RX ADMIN — INSULIN HUMAN SCH UNITS: 100 INJECTION, SOLUTION PARENTERAL at 20:01

## 2020-07-03 RX ADMIN — ZINC OXIDE SCH APPLIC: 200 OINTMENT TOPICAL at 10:09

## 2020-07-03 RX ADMIN — INSULIN HUMAN SCH: 100 INJECTION, SOLUTION PARENTERAL at 07:17

## 2020-07-03 RX ADMIN — HYDROMORPHONE HYDROCHLORIDE PRN MG: 1 INJECTION, SOLUTION INTRAMUSCULAR; INTRAVENOUS; SUBCUTANEOUS at 03:17

## 2020-07-03 RX ADMIN — OXYCODONE AND ACETAMINOPHEN PRN TAB: 5; 325 TABLET ORAL at 08:04

## 2020-07-03 NOTE — PROGRESS NOTE
Assessment and Plan


Assessment and plan: 


Patient is 34 years old male with no significant past medical history except 

multiple hospitalization for perirectal abscess.  Who returns to the hospital 

complaining of worsening pain for 2 days patient had procedure in the past in 

the rectal area with device placed in the rectal area unfortunately has been 

unable to care for himself has a complicated perirectal fistula status post I&D 

and seton placement.  He reports that he has been doing the sitz bath twice 

daily but has not been able to keep up with dressing changes.  Although while in

the hospital is been observed that he cannot do the sitz bath.  An attempt to 

place him into SNF facility last time was unsuccessful due to insurance reasons.

 He has been on multiple antibiotics including meropenem and returns with 

worsening pain. 





He was seen by surgery with recommendation that possibly an diverting colostomy 

would be best for this patient although caring for his disease has been a 

challenge.  A last ditch effort is being done to see if we can get the patient 

trained on how to provide adequate care although his mental capacity seems to be

a barrier.  However also the patient does not want to have this procedure done.


No new infection is noted.








(1) Perirectal abscess


Current Visit: Yes   Status: Acute   


Plan to address problem: 


Continue supportive care.


Continue antibiotics therapy


Continue wound care management.  If patient still unable to care for himself we 

will have another discussion with him about possible diverting colostomy.





(2) Rectal fistula


Current Visit: Yes   Status: Acute   


Plan to address problem: 


SURGICAL CONSULT WITH ON CALL SURGEON.





(3) IDDM (insulin dependent diabetes mellitus)


Current Visit: Yes   Status: Chronic   


Plan to address problem: 


Recent last A1c 7.1 a week ago


Cont Home Insulin and coverage








(4) BPH (benign prostatic hyperplasia)


Current Visit: Yes   Status: Chronic   


Qualifiers: 


   Lower urinary tract symptom presence: symptoms present 


Plan to address problem: 


Cont Flomax








(5) Malnutrition


Current Visit: Yes   Status: Acute   


Qualifiers: 


   Protein-calorie malnutrition severity: severe 


Plan to address problem: 


Albumin 2.1


Dietitian consult


Dietary supplements ordered





(6) group home resident secondary to mental delay.


Continue supportive care








DVT and GI prophylaxis








History


Interval history: 


Patient seen and examined rounding discussed with nursing staff patient still 

with pain requesting pain medication around-the-clock.  Still with drainage from

wound site.  Nursing staff indicates that the patient has difficulty caring for 

the wound and still requires assistance with every wound care.








Hospitalist Physical





- Physical exam


Narrative exam: 


General appearance: Present: no acute distress, well-nourished, reports 

generalized pain





- EENT


Eyes: PERRL, EOM intact


ENT: hearing intact, clear oral mucosa


Ears: bilateral: normal





- Neck


Neck: supple, normal ROM





- Respiratory


Respiratory effort: normal


Respiratory: bilateral: CTA





- Breasts


Breasts: normal





- Cardiovascular


Rhythm: regular


Heart Sounds: Present: S1 & S2.  Absent: gallop, rub


Extremities: pulses intact, No edema, normal color, Full ROM, abnormal wounds 

noted


- Gastrointestinal


General gastrointestinal: Present: soft, non-tender, non-distended, normal bowel

sounds





- Genitourinary


Male genitourinary: normal





- Integumentary


Integumentary: Wounds noted.





- Musculoskeletal


Musculoskeletal: 1, strength equal bilaterally








- Constitutional


Vitals: 


                                        











Temp Pulse Resp BP Pulse Ox


 


 98.3 F   86   18   93/55   98 


 


 07/03/20 11:05  07/03/20 11:05  07/03/20 11:05  07/03/20 11:05  07/03/20 11:05











General appearance: Present: mild distress





Results





- Labs


CBC & Chem 7: 


                                 07/01/20 18:53





                                 07/01/20 18:53


Labs: 


                             Laboratory Last Values











WBC  6.3 K/mm3 (4.5-11.0)   07/01/20  18:53    


 


RBC  3.57 M/mm3 (3.65-5.03)  L  07/01/20  18:53    


 


Hgb  10.1 gm/dl (11.8-15.2)  L  07/01/20  18:53    


 


Hct  31.9 % (35.5-45.6)  L  07/01/20  18:53    


 


MCV  89 fl (84-94)   07/01/20  18:53    


 


MCH  28 pg (28-32)   07/01/20  18:53    


 


MCHC  32 % (32-34)   07/01/20  18:53    


 


RDW  17.1 % (13.2-15.2)  H  07/01/20  18:53    


 


Plt Count  326 K/mm3 (140-440)   07/01/20  18:53    


 


Lymph % (Auto)  26.0 % (13.4-35.0)   07/01/20  18:53    


 


Mono % (Auto)  13.2 % (0.0-7.3)  H  07/01/20  18:53    


 


Eos % (Auto)  1.5 % (0.0-4.3)   07/01/20  18:53    


 


Baso % (Auto)  0.3 % (0.0-1.8)   07/01/20  18:53    


 


Lymph #  1.6 K/mm3 (1.2-5.4)   07/01/20  18:53    


 


Mono #  0.8 K/mm3 (0.0-0.8)   07/01/20  18:53    


 


Eos #  0.1 K/mm3 (0.0-0.4)   07/01/20  18:53    


 


Baso #  0.0 K/mm3 (0.0-0.1)   07/01/20  18:53    


 


Seg Neutrophils %  59.0 % (40.0-70.0)   07/01/20  18:53    


 


Seg Neutrophils #  3.7 K/mm3 (1.8-7.7)   07/01/20  18:53    


 


Sodium  135 mmol/L (137-145)  L  07/01/20  18:53    


 


Potassium  3.9 mmol/L (3.6-5.0)   07/01/20  18:53    


 


Chloride  95.3 mmol/L ()  L  07/01/20  18:53    


 


Carbon Dioxide  28 mmol/L (22-30)   07/01/20  18:53    


 


Anion Gap  16 mmol/L  07/01/20  18:53    


 


BUN  6 mg/dL (9-20)  L  07/01/20  18:53    


 


Creatinine  0.7 mg/dL (0.8-1.5)  L  07/01/20  18:53    


 


Estimated GFR  > 60 ml/min  07/01/20  18:53    


 


BUN/Creatinine Ratio  9 %  07/01/20  18:53    


 


Glucose  384 mg/dL ()  H  07/01/20  18:53    


 


POC Glucose  241  ()  H  07/03/20  11:21    


 


Calcium  9.1 mg/dL (8.4-10.2)   07/01/20  18:53    


 


Total Bilirubin  0.30 mg/dL (0.1-1.2)   07/01/20  18:53    


 


AST  54 units/L (5-40)  H  07/01/20  18:53    


 


ALT  58 units/L (7-56)  H  07/01/20  18:53    


 


Alkaline Phosphatase  636 units/L ()  H  07/01/20  18:53    


 


Total Protein  7.4 g/dL (6.3-8.2)   07/01/20  18:53    


 


Albumin  3.4 g/dL (3.9-5)  L  07/01/20  18:53    


 


Albumin/Globulin Ratio  0.9 %  07/01/20  18:53    


 


Urine Color  Yellow  (Yellow)   07/02/20  04:25    


 


Urine Turbidity  Clear  (Clear)   07/02/20  04:25    


 


Urine pH  5.0  (5.0-7.0)   07/02/20  04:25    


 


Ur Specific Gravity  1.016  (1.003-1.030)   07/02/20  04:25    


 


Urine Protein  <15 mg/dl mg/dL (Negative)   07/02/20  04:25    


 


Urine Glucose (UA)  >=500 mg/dL (Negative)   07/02/20  04:25    


 


Urine Ketones  Neg mg/dL (Negative)   07/02/20  04:25    


 


Urine Blood  Neg  (Negative)   07/02/20  04:25    


 


Urine Nitrite  Neg  (Negative)   07/02/20  04:25    


 


Urine Bilirubin  Neg  (Negative)   07/02/20  04:25    


 


Urine Urobilinogen  < 2.0 mg/dL (<2.0)   07/02/20  04:25    


 


Ur Leukocyte Esterase  Neg  (Negative)   07/02/20  04:25    


 


Urine WBC (Auto)  1.0 /HPF (0.0-6.0)   07/02/20  04:25    


 


Urine RBC (Auto)  < 1.0 /HPF (0.0-6.0)   07/02/20  04:25    


 


U Epithel Cells (Auto)  < 1.0 /HPF (0-13.0)   07/02/20  04:25    


 


Urine Mucus  Few /HPF  07/02/20  04:25    











Microbiology: 


Microbiology





07/01/20 18:53   Peripheral/Venous   Blood Culture - Preliminary


                            NO GROWTH AFTER 24 HOURS


07/01/20 18:58   Peripheral/Venous   Blood Culture - Preliminary


                            NO GROWTH AFTER 24 HOURS








Senior/IV: 


                                        





Voiding Method                   Toilet


IV Catheter Type [right arm]     INT / Saline Lock











Active Medications





- Current Medications


Current Medications: 














Generic Name Dose Route Start Last Admin





  Trade Name Freq  PRN Reason Stop Dose Admin


 


Acetaminophen  650 mg  07/02/20 02:56 





  Tylenol  PO  





  Q4H PRN  





  Fever >101  


 


Dextrose  0 ml  07/02/20 02:48 





  D50w (25gm) Syringe  IV  





  Q30MIN PRN  





  Hypoglycemia  





  Protocol  


 


Hydromorphone HCl  1 mg  07/02/20 02:53  07/03/20 06:24





  Dilaudid  IV   1 mg





  Q4H PRN   Administration





  Pain , Severe (7-10)  


 


Piperacillin Sod/Tazobactam Sod  4.5 gm in 100 mls @ 200 mls/hr  07/02/20 06:00 

07/03/20 06:21





  Zosyn/Ns 4.5gm/100ml  IV   200 mls/hr





  Q6HR VIRGIL   Administration





  Protocol  


 


Insulin Glargine  25 units  07/02/20 22:00  07/02/20 22:26





  Lantus  SUB-Q   25 units





  QHS VIRGIL   Administration


 


Insulin Human Regular  0 units  07/02/20 03:00  07/03/20 07:17





  Humulin R  SUB-Q   Not Given





  Q4H [facility]  





  Protocol  


 


Miscellaneous Medication  1 each  07/02/20 11:00  07/03/20 08:01





  Non-Formulary  PO   1 each





  TID VIRGIL   Administration


 


Miscellaneous Medication  750 mg  07/02/20 20:00 





  Balsalazide Disodium [Colazal]  PO  





  TID VIRGIL  


 


Miscellaneous Medication  300 mg  07/02/20 22:00 





  Ursodiol [Ursodiol]  PO  





  BID VIRGIL  


 


Olanzapine  5 mg  07/03/20 10:00  07/03/20 10:08





  Zyprexa  PO   5 mg





  DAILY VIRGIL   Administration


 


Ondansetron HCl  4 mg  07/02/20 02:56 





  Zofran  IV  





  Q8H PRN  





  Nausea And Vomiting  


 


Oxycodone/Acetaminophen  1 tab  07/02/20 15:21  07/03/20 08:04





  Percocet 5/325  PO   1 tab





  Q6H PRN   Administration





  Pain, Moderate (4-6)  


 


Potassium Chloride  10 meq  07/02/20 22:00  07/03/20 10:08





  K-Dur  PO   10 meq





  BID VIRGIL   Administration


 


Tamsulosin HCl  0.4 mg  07/03/20 10:00  07/03/20 10:08





  Flomax  PO   0.4 mg





  QDAY VIRGIL   Administration


 


Zinc Oxide  1 applic  07/02/20 22:00  07/03/20 10:09





  Zinc Oxide  TP   1 applic





  BID VIRGIL   Administration

## 2020-07-04 RX ADMIN — HYDROMORPHONE HYDROCHLORIDE PRN MG: 1 INJECTION, SOLUTION INTRAMUSCULAR; INTRAVENOUS; SUBCUTANEOUS at 20:16

## 2020-07-04 RX ADMIN — HYDROMORPHONE HYDROCHLORIDE PRN MG: 1 INJECTION, SOLUTION INTRAMUSCULAR; INTRAVENOUS; SUBCUTANEOUS at 06:05

## 2020-07-04 RX ADMIN — POTASSIUM CHLORIDE SCH MEQ: 1500 TABLET, EXTENDED RELEASE ORAL at 22:03

## 2020-07-04 RX ADMIN — INSULIN HUMAN SCH UNITS: 100 INJECTION, SOLUTION PARENTERAL at 06:15

## 2020-07-04 RX ADMIN — INSULIN HUMAN SCH UNITS: 100 INJECTION, SOLUTION PARENTERAL at 03:43

## 2020-07-04 RX ADMIN — TAMSULOSIN HYDROCHLORIDE SCH MG: 0.4 CAPSULE ORAL at 08:03

## 2020-07-04 RX ADMIN — INSULIN HUMAN SCH UNITS: 100 INJECTION, SOLUTION PARENTERAL at 21:01

## 2020-07-04 RX ADMIN — HYDROMORPHONE HYDROCHLORIDE PRN MG: 1 INJECTION, SOLUTION INTRAMUSCULAR; INTRAVENOUS; SUBCUTANEOUS at 16:37

## 2020-07-04 RX ADMIN — OXYCODONE AND ACETAMINOPHEN PRN TAB: 5; 325 TABLET ORAL at 11:24

## 2020-07-04 RX ADMIN — POTASSIUM CHLORIDE SCH: 1500 TABLET, EXTENDED RELEASE ORAL at 11:36

## 2020-07-04 RX ADMIN — PIPERACILLIN AND TAZOBACTAM SCH MLS/HR: 4; .5 INJECTION, POWDER, FOR SOLUTION INTRAVENOUS at 11:27

## 2020-07-04 RX ADMIN — TAMSULOSIN HYDROCHLORIDE SCH: 0.4 CAPSULE ORAL at 11:36

## 2020-07-04 RX ADMIN — PIPERACILLIN AND TAZOBACTAM SCH MLS/HR: 4; .5 INJECTION, POWDER, FOR SOLUTION INTRAVENOUS at 17:26

## 2020-07-04 RX ADMIN — INSULIN HUMAN SCH UNITS: 100 INJECTION, SOLUTION PARENTERAL at 15:57

## 2020-07-04 RX ADMIN — PIPERACILLIN AND TAZOBACTAM SCH MLS/HR: 4; .5 INJECTION, POWDER, FOR SOLUTION INTRAVENOUS at 06:02

## 2020-07-04 RX ADMIN — INSULIN HUMAN SCH UNITS: 100 INJECTION, SOLUTION PARENTERAL at 11:33

## 2020-07-04 RX ADMIN — ZINC OXIDE SCH APPLIC: 200 OINTMENT TOPICAL at 11:00

## 2020-07-04 RX ADMIN — ZINC OXIDE SCH APPLIC: 200 OINTMENT TOPICAL at 22:10

## 2020-07-04 RX ADMIN — POTASSIUM CHLORIDE SCH MEQ: 1500 TABLET, EXTENDED RELEASE ORAL at 08:03

## 2020-07-04 NOTE — PROGRESS NOTE
Assessment and Plan


Assessment and plan: 


Patient is 34 years old male with no significant past medical history except 

multiple hospitalization for perirectal abscess.  Who returns to the hospital 

complaining of worsening pain for 2 days patient had procedure in the past in 

the rectal area with device placed in the rectal area unfortunately has been 

unable to care for himself has a complicated perirectal fistula status post I&D 

and seton placement.  He reports that he has been doing the sitz bath twice 

daily but has not been able to keep up with dressing changes.  Although while in

the hospital is been observed that he cannot do the sitz bath.  An attempt to 

place him into SNF facility last time was unsuccessful due to insurance reasons.

 He has been on multiple antibiotics including meropenem and returns with 

worsening pain. 





He was seen by surgery with recommendation that possibly an diverting colostomy 

would be best for this patient although caring for his disease has been a 

challenge.  A last ditch effort is being done to see if we can get the patient 

trained on how to provide adequate care although his mental capacity seems to be

a barrier.  However also the patient does not want to have this procedure done.


No new infection is noted.





7/4: Adjust blood glucose to prevent hypoglycemia, will need assistance with 

case management for discharge planl





(1) Perirectal abscess


Current Visit: Yes   Status: Acute   


Plan to address problem: 


Continue supportive care.


Continue antibiotics therapy


Continue wound care management.  If patient still unable to care for himself we 

will have another discussion with him about possible diverting colostomy.





(2) Rectal fistula


Current Visit: Yes   Status: Acute   


Plan to address problem: 


SURGICAL CONSULT WITH ON CALL SURGEON.





(3) IDDM (insulin dependent diabetes mellitus)


Current Visit: Yes   Status: Chronic   


Plan to address problem: 


Recent last A1c 7.1 a week ago


Cont Home Insulin and coverage








(4) BPH (benign prostatic hyperplasia)


Current Visit: Yes   Status: Chronic   


Qualifiers: 


   Lower urinary tract symptom presence: symptoms present 


Plan to address problem: 


Cont Flomax








(5) Malnutrition


Current Visit: Yes   Status: Acute   


Qualifiers: 


   Protein-calorie malnutrition severity: severe 


Plan to address problem: 


Albumin 2.1


Dietitian consult


Dietary supplements ordered





(6) group home resident secondary to mental delay.


Continue supportive care








DVT and GI prophylaxis








History


Interval history: 


Patient seen and examined. No new complaints





Hospitalist Physical





- Physical exam


Narrative exam: 


General appearance: Present: no acute distress, well-nourished, reports 

generalized pain





- EENT


Eyes: PERRL, EOM intact


ENT: hearing intact, clear oral mucosa


Ears: bilateral: normal





- Neck


Neck: supple, normal ROM





- Respiratory


Respiratory effort: normal


Respiratory: bilateral: CTA





- Breasts


Breasts: normal





- Cardiovascular


Rhythm: regular


Heart Sounds: Present: S1 & S2.  Absent: gallop, rub


Extremities: pulses intact, No edema, normal color, Full ROM, abnormal wounds 

noted


- Gastrointestinal


General gastrointestinal: Present: soft, non-tender, non-distended, normal bowel

sounds





- Genitourinary


Male genitourinary: normal





- Integumentary


Integumentary: Wounds noted.





- Musculoskeletal


Musculoskeletal: 1, strength equal bilaterally








- Constitutional


Vitals: 


                                        











Temp Pulse Resp BP Pulse Ox


 


 97.9 F   68   18   96/62   96 


 


 07/04/20 07:21  07/04/20 07:21  07/04/20 07:21  07/04/20 07:21  07/04/20 07:21











General appearance: Present: mild distress





Results





- Labs


CBC & Chem 7: 


                                 07/01/20 18:53





                                 07/01/20 18:53


Labs: 


                             Laboratory Last Values











WBC  6.3 K/mm3 (4.5-11.0)   07/01/20  18:53    


 


RBC  3.57 M/mm3 (3.65-5.03)  L  07/01/20  18:53    


 


Hgb  10.1 gm/dl (11.8-15.2)  L  07/01/20  18:53    


 


Hct  31.9 % (35.5-45.6)  L  07/01/20  18:53    


 


MCV  89 fl (84-94)   07/01/20  18:53    


 


MCH  28 pg (28-32)   07/01/20  18:53    


 


MCHC  32 % (32-34)   07/01/20  18:53    


 


RDW  17.1 % (13.2-15.2)  H  07/01/20  18:53    


 


Plt Count  326 K/mm3 (140-440)   07/01/20  18:53    


 


Lymph % (Auto)  26.0 % (13.4-35.0)   07/01/20  18:53    


 


Mono % (Auto)  13.2 % (0.0-7.3)  H  07/01/20  18:53    


 


Eos % (Auto)  1.5 % (0.0-4.3)   07/01/20  18:53    


 


Baso % (Auto)  0.3 % (0.0-1.8)   07/01/20  18:53    


 


Lymph #  1.6 K/mm3 (1.2-5.4)   07/01/20  18:53    


 


Mono #  0.8 K/mm3 (0.0-0.8)   07/01/20  18:53    


 


Eos #  0.1 K/mm3 (0.0-0.4)   07/01/20  18:53    


 


Baso #  0.0 K/mm3 (0.0-0.1)   07/01/20  18:53    


 


Seg Neutrophils %  59.0 % (40.0-70.0)   07/01/20  18:53    


 


Seg Neutrophils #  3.7 K/mm3 (1.8-7.7)   07/01/20  18:53    


 


Sodium  135 mmol/L (137-145)  L  07/01/20  18:53    


 


Potassium  3.9 mmol/L (3.6-5.0)   07/01/20  18:53    


 


Chloride  95.3 mmol/L ()  L  07/01/20  18:53    


 


Carbon Dioxide  28 mmol/L (22-30)   07/01/20  18:53    


 


Anion Gap  16 mmol/L  07/01/20  18:53    


 


BUN  6 mg/dL (9-20)  L  07/01/20  18:53    


 


Creatinine  0.7 mg/dL (0.8-1.5)  L  07/01/20  18:53    


 


Estimated GFR  > 60 ml/min  07/01/20  18:53    


 


BUN/Creatinine Ratio  9 %  07/01/20  18:53    


 


Glucose  384 mg/dL ()  H  07/01/20  18:53    


 


POC Glucose  71  ()   07/04/20  07:25    


 


Calcium  9.1 mg/dL (8.4-10.2)   07/01/20  18:53    


 


Total Bilirubin  0.30 mg/dL (0.1-1.2)   07/01/20  18:53    


 


AST  54 units/L (5-40)  H  07/01/20  18:53    


 


ALT  58 units/L (7-56)  H  07/01/20  18:53    


 


Alkaline Phosphatase  636 units/L ()  H  07/01/20  18:53    


 


Total Protein  7.4 g/dL (6.3-8.2)   07/01/20  18:53    


 


Albumin  3.4 g/dL (3.9-5)  L  07/01/20  18:53    


 


Albumin/Globulin Ratio  0.9 %  07/01/20  18:53    


 


Urine Color  Yellow  (Yellow)   07/02/20  04:25    


 


Urine Turbidity  Clear  (Clear)   07/02/20  04:25    


 


Urine pH  5.0  (5.0-7.0)   07/02/20  04:25    


 


Ur Specific Gravity  1.016  (1.003-1.030)   07/02/20  04:25    


 


Urine Protein  <15 mg/dl mg/dL (Negative)   07/02/20  04:25    


 


Urine Glucose (UA)  >=500 mg/dL (Negative)   07/02/20  04:25    


 


Urine Ketones  Neg mg/dL (Negative)   07/02/20  04:25    


 


Urine Blood  Neg  (Negative)   07/02/20  04:25    


 


Urine Nitrite  Neg  (Negative)   07/02/20  04:25    


 


Urine Bilirubin  Neg  (Negative)   07/02/20  04:25    


 


Urine Urobilinogen  < 2.0 mg/dL (<2.0)   07/02/20  04:25    


 


Ur Leukocyte Esterase  Neg  (Negative)   07/02/20  04:25    


 


Urine WBC (Auto)  1.0 /HPF (0.0-6.0)   07/02/20  04:25    


 


Urine RBC (Auto)  < 1.0 /HPF (0.0-6.0)   07/02/20  04:25    


 


U Epithel Cells (Auto)  < 1.0 /HPF (0-13.0)   07/02/20  04:25    


 


Urine Mucus  Few /HPF  07/02/20  04:25    











Microbiology: 


Microbiology





07/01/20 18:53   Peripheral/Venous   Blood Culture - Preliminary


                            NO GROWTH AFTER 48 HOURS


07/01/20 18:58   Peripheral/Venous   Blood Culture - Preliminary


                            NO GROWTH AFTER 48 HOURS








Senior/IV: 


                                        





Voiding Method                   Urinal


IV Catheter Type [right arm]     INT / Saline Lock











Active Medications





- Current Medications


Current Medications: 














Generic Name Dose Route Start Last Admin





  Trade Name Freq  PRN Reason Stop Dose Admin


 


Acetaminophen  650 mg  07/02/20 02:56 





  Tylenol  PO  





  Q4H PRN  





  Fever >101  


 


Dextrose  0 ml  07/02/20 02:48 





  D50w (25gm) Syringe  IV  





  Q30MIN PRN  





  Hypoglycemia  





  Protocol  


 


Hydromorphone HCl  1 mg  07/02/20 02:53  07/04/20 06:05





  Dilaudid  IV   1 mg





  Q4H PRN   Administration





  Pain , Severe (7-10)  


 


Piperacillin Sod/Tazobactam Sod  4.5 gm in 100 mls @ 200 mls/hr  07/02/20 06:00 

07/04/20 06:02





  Zosyn/Ns 4.5gm/100ml  IV   200 mls/hr





  Q6HR VIRGIL   Administration





  Protocol  


 


Insulin Glargine  15 units  07/04/20 09:48 





  Lantus  SUB-Q  





  QHS VIRGIL  


 


Insulin Human Regular  0 units  07/02/20 03:00  07/04/20 06:15





  Humulin R  SUB-Q   4 units





  Q4H VIRGIL   Administration





  Protocol  


 


Miscellaneous Medication  1 each  07/02/20 11:00  07/04/20 08:02





  Non-Formulary  PO   1 each





  TID VIRGIL   Administration


 


Miscellaneous Medication  750 mg  07/02/20 20:00 





  Balsalazide Disodium [Colazal]  PO  





  TID VIRGIL  


 


Miscellaneous Medication  300 mg  07/02/20 22:00 





  Ursodiol [Ursodiol]  PO  





  BID VIRGIL  


 


Olanzapine  5 mg  07/03/20 10:00  07/04/20 08:03





  Zyprexa  PO   5 mg





  DAILY VIRGIL   Administration


 


Ondansetron HCl  4 mg  07/02/20 02:56 





  Zofran  IV  





  Q8H PRN  





  Nausea And Vomiting  


 


Oxycodone/Acetaminophen  1 tab  07/02/20 15:21  07/03/20 23:21





  Percocet 5/325  PO   1 tab





  Q6H PRN   Administration





  Pain, Moderate (4-6)  


 


Potassium Chloride  10 meq  07/02/20 22:00  07/04/20 08:03





  K-Dur  PO   10 meq





  BID VIRGIL   Administration


 


Tamsulosin HCl  0.4 mg  07/03/20 10:00  07/04/20 08:03





  Flomax  PO   0.4 mg





  QDAY VIRGIL   Administration


 


Zinc Oxide  1 applic  07/02/20 22:00  07/03/20 21:11





  Zinc Oxide  TP   1 applic





  BID VIRGIL   Administration

## 2020-07-05 VITALS — SYSTOLIC BLOOD PRESSURE: 106 MMHG | DIASTOLIC BLOOD PRESSURE: 65 MMHG

## 2020-07-05 RX ADMIN — INSULIN HUMAN SCH UNITS: 100 INJECTION, SOLUTION PARENTERAL at 11:23

## 2020-07-05 RX ADMIN — PIPERACILLIN AND TAZOBACTAM SCH MLS/HR: 4; .5 INJECTION, POWDER, FOR SOLUTION INTRAVENOUS at 00:28

## 2020-07-05 RX ADMIN — OXYCODONE AND ACETAMINOPHEN PRN TAB: 5; 325 TABLET ORAL at 09:36

## 2020-07-05 RX ADMIN — ZINC OXIDE SCH APPLIC: 200 OINTMENT TOPICAL at 11:25

## 2020-07-05 RX ADMIN — POTASSIUM CHLORIDE SCH MEQ: 1500 TABLET, EXTENDED RELEASE ORAL at 09:35

## 2020-07-05 RX ADMIN — HYDROMORPHONE HYDROCHLORIDE PRN MG: 1 INJECTION, SOLUTION INTRAMUSCULAR; INTRAVENOUS; SUBCUTANEOUS at 00:18

## 2020-07-05 RX ADMIN — TAMSULOSIN HYDROCHLORIDE SCH MG: 0.4 CAPSULE ORAL at 09:37

## 2020-07-05 RX ADMIN — INSULIN HUMAN SCH UNITS: 100 INJECTION, SOLUTION PARENTERAL at 00:24

## 2020-07-05 RX ADMIN — INSULIN HUMAN SCH UNITS: 100 INJECTION, SOLUTION PARENTERAL at 08:03

## 2020-07-05 RX ADMIN — HYDROMORPHONE HYDROCHLORIDE PRN MG: 1 INJECTION, SOLUTION INTRAMUSCULAR; INTRAVENOUS; SUBCUTANEOUS at 06:18

## 2020-07-05 RX ADMIN — INSULIN HUMAN SCH: 100 INJECTION, SOLUTION PARENTERAL at 03:00

## 2020-07-05 RX ADMIN — PIPERACILLIN AND TAZOBACTAM SCH MLS/HR: 4; .5 INJECTION, POWDER, FOR SOLUTION INTRAVENOUS at 06:14

## 2020-07-05 NOTE — PROGRESS NOTE
Assessment and Plan


Assessment and plan: 


Patient is 34 years old male with no significant past medical history except 

multiple hospitalization for perirectal abscess.  Who returns to the hospital 

complaining of worsening pain for 2 days patient had procedure in the past in 

the rectal area with device placed in the rectal area unfortunately has been 

unable to care for himself has a complicated perirectal fistula status post I&D 

and seton placement.  He reports that he has been doing the sitz bath twice 

daily but has not been able to keep up with dressing changes.  Although while in

the hospital is been observed that he cannot do the sitz bath.  An attempt to 

place him into SNF facility last time was unsuccessful due to insurance reasons.

 He has been on multiple antibiotics including meropenem and returns with 

worsening pain. 





He was seen by surgery with recommendation that possibly an diverting colostomy 

would be best for this patient although caring for his disease has been a 

challenge.  A last ditch effort is being done to see if we can get the patient 

trained on how to provide adequate care although his mental capacity seems to be

a barrier.  However also the patient does not want to have this procedure done.


No new infection is noted.





7/4: Adjust blood glucose to prevent hypoglycemia, will need assistance with 

case management for discharge plan





7/5: Continue current care.  Awaiting reevaluation by surgery.  No further 

hypoglycemic episode noted.





(1) Perirectal abscess


Current Visit: Yes   Status: Acute   


Plan to address problem: 


Continue supportive care.


Continue antibiotics therapy


Continue wound care management.  If patient still unable to care for himself we 

will have another discussion with him about possible diverting colostomy.





(2) Rectal fistula


Current Visit: Yes   Status: Acute   


Plan to address problem: 


SURGICAL CONSULT WITH ON CALL SURGEON.





(3) IDDM (insulin dependent diabetes mellitus)


Current Visit: Yes   Status: Chronic   


Plan to address problem: 


Recent last A1c 7.1 a week ago


Cont Home Insulin and coverage








(4) BPH (benign prostatic hyperplasia)


Current Visit: Yes   Status: Chronic   


Qualifiers: 


   Lower urinary tract symptom presence: symptoms present 


Plan to address problem: 


Cont Flomax








(5) Malnutrition


Current Visit: Yes   Status: Acute   


Qualifiers: 


   Protein-calorie malnutrition severity: severe 


Plan to address problem: 


Albumin 2.1


Dietitian consult


Dietary supplements ordered





(6) group home resident secondary to mental delay.


Continue supportive care








DVT and GI prophylaxis








Hospitalist Physical





- Constitutional


Vitals: 


                                        











Temp Pulse Resp BP Pulse Ox


 


 98.1 F   102 H  18   92/60   94 


 


 07/05/20 07:20  07/05/20 07:20  07/05/20 07:20  07/05/20 07:20  07/05/20 07:20











General appearance: Present: mild distress





Results





- Labs


CBC & Chem 7: 


                                 07/01/20 18:53





                                 07/01/20 18:53


Labs: 


                             Laboratory Last Values











WBC  6.3 K/mm3 (4.5-11.0)   07/01/20  18:53    


 


RBC  3.57 M/mm3 (3.65-5.03)  L  07/01/20  18:53    


 


Hgb  10.1 gm/dl (11.8-15.2)  L  07/01/20  18:53    


 


Hct  31.9 % (35.5-45.6)  L  07/01/20  18:53    


 


MCV  89 fl (84-94)   07/01/20  18:53    


 


MCH  28 pg (28-32)   07/01/20  18:53    


 


MCHC  32 % (32-34)   07/01/20  18:53    


 


RDW  17.1 % (13.2-15.2)  H  07/01/20  18:53    


 


Plt Count  326 K/mm3 (140-440)   07/01/20  18:53    


 


Lymph % (Auto)  26.0 % (13.4-35.0)   07/01/20  18:53    


 


Mono % (Auto)  13.2 % (0.0-7.3)  H  07/01/20  18:53    


 


Eos % (Auto)  1.5 % (0.0-4.3)   07/01/20  18:53    


 


Baso % (Auto)  0.3 % (0.0-1.8)   07/01/20  18:53    


 


Lymph #  1.6 K/mm3 (1.2-5.4)   07/01/20  18:53    


 


Mono #  0.8 K/mm3 (0.0-0.8)   07/01/20  18:53    


 


Eos #  0.1 K/mm3 (0.0-0.4)   07/01/20  18:53    


 


Baso #  0.0 K/mm3 (0.0-0.1)   07/01/20  18:53    


 


Seg Neutrophils %  59.0 % (40.0-70.0)   07/01/20  18:53    


 


Seg Neutrophils #  3.7 K/mm3 (1.8-7.7)   07/01/20  18:53    


 


Sodium  135 mmol/L (137-145)  L  07/01/20  18:53    


 


Potassium  3.9 mmol/L (3.6-5.0)   07/01/20  18:53    


 


Chloride  95.3 mmol/L ()  L  07/01/20  18:53    


 


Carbon Dioxide  28 mmol/L (22-30)   07/01/20  18:53    


 


Anion Gap  16 mmol/L  07/01/20  18:53    


 


BUN  6 mg/dL (9-20)  L  07/01/20  18:53    


 


Creatinine  0.7 mg/dL (0.8-1.5)  L  07/01/20  18:53    


 


Estimated GFR  > 60 ml/min  07/01/20  18:53    


 


BUN/Creatinine Ratio  9 %  07/01/20  18:53    


 


Glucose  384 mg/dL ()  H  07/01/20  18:53    


 


POC Glucose  192  ()  H  07/05/20  07:11    


 


Calcium  9.1 mg/dL (8.4-10.2)   07/01/20  18:53    


 


Total Bilirubin  0.30 mg/dL (0.1-1.2)   07/01/20  18:53    


 


AST  54 units/L (5-40)  H  07/01/20  18:53    


 


ALT  58 units/L (7-56)  H  07/01/20  18:53    


 


Alkaline Phosphatase  636 units/L ()  H  07/01/20  18:53    


 


Total Protein  7.4 g/dL (6.3-8.2)   07/01/20  18:53    


 


Albumin  3.4 g/dL (3.9-5)  L  07/01/20  18:53    


 


Albumin/Globulin Ratio  0.9 %  07/01/20  18:53    


 


Urine Color  Yellow  (Yellow)   07/02/20  04:25    


 


Urine Turbidity  Clear  (Clear)   07/02/20  04:25    


 


Urine pH  5.0  (5.0-7.0)   07/02/20  04:25    


 


Ur Specific Gravity  1.016  (1.003-1.030)   07/02/20  04:25    


 


Urine Protein  <15 mg/dl mg/dL (Negative)   07/02/20  04:25    


 


Urine Glucose (UA)  >=500 mg/dL (Negative)   07/02/20  04:25    


 


Urine Ketones  Neg mg/dL (Negative)   07/02/20  04:25    


 


Urine Blood  Neg  (Negative)   07/02/20  04:25    


 


Urine Nitrite  Neg  (Negative)   07/02/20  04:25    


 


Urine Bilirubin  Neg  (Negative)   07/02/20  04:25    


 


Urine Urobilinogen  < 2.0 mg/dL (<2.0)   07/02/20  04:25    


 


Ur Leukocyte Esterase  Neg  (Negative)   07/02/20  04:25    


 


Urine WBC (Auto)  1.0 /HPF (0.0-6.0)   07/02/20  04:25    


 


Urine RBC (Auto)  < 1.0 /HPF (0.0-6.0)   07/02/20  04:25    


 


U Epithel Cells (Auto)  < 1.0 /HPF (0-13.0)   07/02/20  04:25    


 


Urine Mucus  Few /HPF  07/02/20  04:25    











Microbiology: 


Microbiology





07/01/20 18:53   Peripheral/Venous   Blood Culture - Preliminary


                            NO GROWTH AFTER 72 HOURS


07/01/20 18:58   Peripheral/Venous   Blood Culture - Preliminary


                            NO GROWTH AFTER 72 HOURS








Senior/IV: 


                                        





Voiding Method                   Toilet


IV Catheter Type [right arm]     INT / Saline Lock











Active Medications





- Current Medications


Current Medications: 














Generic Name Dose Route Start Last Admin





  Trade Name Freq  PRN Reason Stop Dose Admin


 


Acetaminophen  650 mg  07/02/20 02:56 





  Tylenol  PO  





  Q4H PRN  





  Fever >101  


 


Dextrose  0 ml  07/02/20 02:48 





  D50w (25gm) Syringe  IV  





  Q30MIN PRN  





  Hypoglycemia  





  Protocol  


 


Hydromorphone HCl  1 mg  07/02/20 02:53  07/05/20 06:18





  Dilaudid  IV   1 mg





  Q4H PRN   Administration





  Pain , Severe (7-10)  


 


Piperacillin Sod/Tazobactam Sod  4.5 gm in 100 mls @ 200 mls/hr  07/02/20 06:00 

07/05/20 06:14





  Zosyn/Ns 4.5gm/100ml  IV   200 mls/hr





  Q6HR VIRGIL   Administration





  Protocol  


 


Insulin Glargine  15 units  07/04/20 22:00  07/04/20 21:02





  Lantus  SUB-Q   15 units





  QHS VIRGIL   Administration


 


Insulin Human Regular  0 units  07/02/20 03:00  07/05/20 03:00





  Humulin R  SUB-Q   Not Given





  Q4H Count includes the [facility]  





  Protocol  


 


Miscellaneous Medication  1 each  07/02/20 11:00  07/04/20 20:37





  Non-Formulary  PO   1 each





  TID VIRGIL   Administration


 


Miscellaneous Medication  750 mg  07/02/20 20:00 





  Balsalazide Disodium [Colazal]  PO  





  TID Count includes the [facility]  


 


Miscellaneous Medication  300 mg  07/02/20 22:00 





  Ursodiol [Ursodiol]  PO  





  BID Count includes the [facility]  


 


Olanzapine  5 mg  07/03/20 10:00  07/04/20 11:35





  Zyprexa  PO   Not Given





  DAILY Count includes the [facility]  


 


Ondansetron HCl  4 mg  07/02/20 02:56 





  Zofran  IV  





  Q8H PRN  





  Nausea And Vomiting  


 


Oxycodone/Acetaminophen  1 tab  07/02/20 15:21  07/04/20 11:24





  Percocet 5/325  PO   1 tab





  Q6H PRN   Administration





  Pain, Moderate (4-6)  


 


Potassium Chloride  10 meq  07/02/20 22:00  07/04/20 22:03





  K-Dur  PO   10 meq





  BID VIRGIL   Administration


 


Tamsulosin HCl  0.4 mg  07/03/20 10:00  07/04/20 11:36





  Flomax  PO   Not Given





  QDAY Count includes the [facility]  


 


Zinc Oxide  1 applic  07/02/20 22:00  07/04/20 22:10





  Zinc Oxide  TP   1 applic





  BID VIRGIL   Administration

## 2020-07-05 NOTE — DISCHARGE SUMMARY
Providers





- Providers


Date of Admission: 


07/02/20 02:03





Attending physician: 


CHERRI WHEELER MD





                                        





07/02/20 01:29


Consult to Physician [CONS] Routine 


   Comment: 


   Consulting Provider: ANTHONY HAWK


   Physician Instructions: 


   Reason For Exam: RECTAL FIST





07/05/20 08:53


Consult to Case Management [CONS] Routine 


   Services Needed at Discharge: 


   Notified:: cm


   Additional Physician Instructions: placement





07/05/20 08:54


Consult to Wound/ET Nurse [CONS] Routine 


   Reason For Exam: wound eval











Primary care physician: 


PRIMARY CARE MD








Hospitalization


Reason for admission: Perirectal abscess


Condition: Stable


Hospital course: 


Patient is 34 years old male with no significant past medical history except 

multiple hospitalization for perirectal abscess.  Who returns to the hospital 

complaining of worsening pain for 2 days patient had procedure in the past in 

the rectal area with device placed in the rectal area unfortunately has been 

unable to care for himself has a complicated perirectal fistula status post I&D 

and seton placement.  He reports that he has been doing the sitz bath twice 

daily but has not been able to keep up with dressing changes.  Although while in

 the hospital is been observed that he cannot do the sitz bath.  An attempt to 

place him into SNF facility last time was unsuccessful due to insurance reasons.

  He has been on multiple antibiotics including meropenem and returns with 

worsening pain. 





He was seen by surgery with recommendation that possibly an diverting colostomy 

would be best for this patient although caring for his disease has been a 

challenge.  A last ditch effort is being done to see if we can get the patient 

trained on how to provide adequate care although his mental capacity seems to be

 a barrier.  However also the patient does not want to have this procedure done.


No new infection is noted.





7/4: Adjust blood glucose to prevent hypoglycemia, will need assistance with 

case management for discharge plan





7/5: Patient clinically stable no urgency at this time to pursue diverting 

colostomy again patient is refusing this procedure at this time.  No fever no 

indication for continued antibiotics will continue Imodium continue to encourage

 sitz bath Case management has discussed with the accepting facility patient 

will be discharged to follow-up outpatient.








(1) Perirectal abscess


Current Visit: Yes   Status: Acute   


Plan to address problem: 


Continue supportive care.


No evidence of New or ongoing infection


Continue antibiotics therapy


Continue wound care management.  If patient still unable to care for himself we 

will have another discussion with him about possible diverting colostomy.





(2) Rectal fistula


Current Visit: Yes   Status: Acute   


Plan to address problem: 


SURGICAL CONSULT WITH ON CALL SURGEON.





(3) IDDM (insulin dependent diabetes mellitus)


Current Visit: Yes   Status: Chronic   


Plan to address problem: 


Recent last A1c 7.1 a week ago


Cont Home Insulin and coverage








(4) BPH (benign prostatic hyperplasia)


Current Visit: Yes   Status: Chronic   


Qualifiers: 


   Lower urinary tract symptom presence: symptoms present 


Plan to address problem: 


Cont Flomax





(5) Malnutrition


Current Visit: Yes   Status: Acute   


Qualifiers: 


   Protein-calorie malnutrition severity: severe 


Plan to address problem: 


Albumin 2.1


Dietitian consult


Dietary supplements ordered





(6) group home resident secondary to mental delay.


Continue supportive care








Disposition: DC/TX-06 HOME UNDER HOME [facility]


Time spent for discharge: 35 minutes





Core Measure Documentation





- Palliative Care


Palliative Care/ Comfort Measures: Not Applicable





- Core Measures


Any of the following diagnoses?: none





Exam





- Physical Exam


Narrative exam: 


General appearance: Present: no acute distress, well-nourished, reports 

generalized pain





- EENT


Eyes: PERRL, EOM intact


ENT: hearing intact, clear oral mucosa


Ears: bilateral: normal





- Neck


Neck: supple, normal ROM





- Respiratory


Respiratory effort: normal


Respiratory: bilateral: CTA





- Breasts


Breasts: normal





- Cardiovascular


Rhythm: regular


Heart Sounds: Present: S1 & S2.  Absent: gallop, rub


Extremities: pulses intact, No edema, normal color, Full ROM, abnormal wounds 

noted


- Gastrointestinal


General gastrointestinal: Present: soft, non-tender, non-distended, normal bowel

 sounds





- Genitourinary


Male genitourinary: normal





- Integumentary


Integumentary: Wounds noted.





- Musculoskeletal


Musculoskeletal: 1, strength equal bilaterally








- Constitutional


Vitals: 


                                        











Temp Pulse Resp BP Pulse Ox


 


 98.1 F   102 H  18   92/60   94 


 


 07/05/20 07:20  07/05/20 07:20  07/05/20 07:20  07/05/20 07:20  07/05/20 07:20














Plan


Activity: advance as tolerated, fall precautions


Diet: low fat


Special Instructions: record daily weights, record daily BP diary, other (Sitz 

bath twice a day)


Follow up with: 


PRIMARY CARE,MD [Primary Care Provider] - 3-5 Days


RHIANNA LECHUGA MD [Staff Physician] - 7 Days


Prescriptions: 


Loperamide [Imodium] 2 mg PO TID #30 capsule


oxyCODONE /ACETAMINOPHEN [Percocet 5/325 mg] 1 tab PO Q6H PRN #14 tablet


 PRN Reason: Pain, Moderate (4-6)

## 2020-08-17 ENCOUNTER — HOSPITAL ENCOUNTER (OUTPATIENT)
Dept: HOSPITAL 5 - ED | Age: 35
Setting detail: OBSERVATION
LOS: 2 days | Discharge: HOME | End: 2020-08-19
Attending: INTERNAL MEDICINE | Admitting: INTERNAL MEDICINE
Payer: SELF-PAY

## 2020-08-17 DIAGNOSIS — E87.6: ICD-10-CM

## 2020-08-17 DIAGNOSIS — F32.9: ICD-10-CM

## 2020-08-17 DIAGNOSIS — E83.42: ICD-10-CM

## 2020-08-17 DIAGNOSIS — Z71.6: ICD-10-CM

## 2020-08-17 DIAGNOSIS — E87.0: ICD-10-CM

## 2020-08-17 DIAGNOSIS — Z79.4: ICD-10-CM

## 2020-08-17 DIAGNOSIS — F17.200: ICD-10-CM

## 2020-08-17 DIAGNOSIS — Z79.899: ICD-10-CM

## 2020-08-17 DIAGNOSIS — K61.1: Primary | ICD-10-CM

## 2020-08-17 DIAGNOSIS — Z88.2: ICD-10-CM

## 2020-08-17 DIAGNOSIS — E11.65: ICD-10-CM

## 2020-08-17 PROCEDURE — 82140 ASSAY OF AMMONIA: CPT

## 2020-08-17 PROCEDURE — 83036 HEMOGLOBIN GLYCOSYLATED A1C: CPT

## 2020-08-17 PROCEDURE — A6250 SKIN SEAL PROTECT MOISTURIZR: HCPCS

## 2020-08-17 PROCEDURE — 96367 TX/PROPH/DG ADDL SEQ IV INF: CPT

## 2020-08-17 PROCEDURE — 96372 THER/PROPH/DIAG INJ SC/IM: CPT

## 2020-08-17 PROCEDURE — 82962 GLUCOSE BLOOD TEST: CPT

## 2020-08-17 PROCEDURE — 99406 BEHAV CHNG SMOKING 3-10 MIN: CPT

## 2020-08-17 PROCEDURE — 96376 TX/PRO/DX INJ SAME DRUG ADON: CPT

## 2020-08-17 PROCEDURE — 96366 THER/PROPH/DIAG IV INF ADDON: CPT

## 2020-08-17 PROCEDURE — G0378 HOSPITAL OBSERVATION PER HR: HCPCS

## 2020-08-17 PROCEDURE — 36415 COLL VENOUS BLD VENIPUNCTURE: CPT

## 2020-08-17 PROCEDURE — 85025 COMPLETE CBC W/AUTO DIFF WBC: CPT

## 2020-08-17 PROCEDURE — 83735 ASSAY OF MAGNESIUM: CPT

## 2020-08-17 PROCEDURE — 81001 URINALYSIS AUTO W/SCOPE: CPT

## 2020-08-17 PROCEDURE — 96365 THER/PROPH/DIAG IV INF INIT: CPT

## 2020-08-17 PROCEDURE — 80048 BASIC METABOLIC PNL TOTAL CA: CPT

## 2020-08-17 PROCEDURE — 96375 TX/PRO/DX INJ NEW DRUG ADDON: CPT

## 2020-08-17 PROCEDURE — 80053 COMPREHEN METABOLIC PANEL: CPT

## 2020-08-17 PROCEDURE — 96361 HYDRATE IV INFUSION ADD-ON: CPT

## 2020-08-17 PROCEDURE — 99284 EMERGENCY DEPT VISIT MOD MDM: CPT

## 2020-08-18 LAB
ALBUMIN SERPL-MCNC: 2.3 G/DL (ref 3.9–5)
ALT SERPL-CCNC: 19 UNITS/L (ref 7–56)
BASOPHILS # (AUTO): 0 K/MM3 (ref 0–0.1)
BASOPHILS NFR BLD AUTO: 0.2 % (ref 0–1.8)
BILIRUB UR QL STRIP: (no result)
BLOOD UR QL VISUAL: (no result)
BUN SERPL-MCNC: 3 MG/DL (ref 9–20)
BUN SERPL-MCNC: 4 MG/DL (ref 9–20)
BUN/CREAT SERPL: 5 %
BUN/CREAT SERPL: 5 %
CALCIUM SERPL-MCNC: 8 MG/DL (ref 8.4–10.2)
CALCIUM SERPL-MCNC: 8.8 MG/DL (ref 8.4–10.2)
EOSINOPHIL # BLD AUTO: 0 K/MM3 (ref 0–0.4)
EOSINOPHIL NFR BLD AUTO: 0.7 % (ref 0–4.3)
HCT VFR BLD CALC: 29 % (ref 35.5–45.6)
HEMOLYSIS INDEX: 11
HEMOLYSIS INDEX: 3
HGB BLD-MCNC: 9.5 GM/DL (ref 11.8–15.2)
LYMPHOCYTES # BLD AUTO: 1.5 K/MM3 (ref 1.2–5.4)
LYMPHOCYTES NFR BLD AUTO: 26.1 % (ref 13.4–35)
MCHC RBC AUTO-ENTMCNC: 33 % (ref 32–34)
MCV RBC AUTO: 83 FL (ref 84–94)
MONOCYTES # (AUTO): 0.7 K/MM3 (ref 0–0.8)
MONOCYTES % (AUTO): 11.8 % (ref 0–7.3)
MUCOUS THREADS #/AREA URNS HPF: (no result) /HPF
PH UR STRIP: 6 [PH] (ref 5–7)
PLATELET # BLD: 213 K/MM3 (ref 140–440)
PROT UR STRIP-MCNC: (no result) MG/DL
RBC # BLD AUTO: 3.48 M/MM3 (ref 3.65–5.03)
RBC #/AREA URNS HPF: < 1 /HPF (ref 0–6)
UROBILINOGEN UR-MCNC: < 2 MG/DL (ref ?–2)
WBC #/AREA URNS HPF: < 1 /HPF (ref 0–6)

## 2020-08-18 RX ADMIN — NICOTINE SCH MG: 7 PATCH TRANSDERMAL at 13:24

## 2020-08-18 RX ADMIN — METRONIDAZOLE SCH MLS/HR: 5 INJECTION, SOLUTION INTRAVENOUS at 21:02

## 2020-08-18 RX ADMIN — LOPERAMIDE HYDROCHLORIDE SCH MG: 2 CAPSULE ORAL at 13:21

## 2020-08-18 RX ADMIN — INSULIN LISPRO SCH UNIT: 100 INJECTION, SOLUTION INTRAVENOUS; SUBCUTANEOUS at 18:25

## 2020-08-18 RX ADMIN — INSULIN LISPRO SCH UNIT: 100 INJECTION, SOLUTION INTRAVENOUS; SUBCUTANEOUS at 08:27

## 2020-08-18 RX ADMIN — Medication SCH ML: at 13:24

## 2020-08-18 RX ADMIN — MORPHINE SULFATE PRN MG: 2 INJECTION, SOLUTION INTRAMUSCULAR; INTRAVENOUS at 08:26

## 2020-08-18 RX ADMIN — INSULIN LISPRO SCH: 100 INJECTION, SOLUTION INTRAVENOUS; SUBCUTANEOUS at 23:01

## 2020-08-18 RX ADMIN — LOPERAMIDE HYDROCHLORIDE SCH MG: 2 CAPSULE ORAL at 21:03

## 2020-08-18 RX ADMIN — OXYCODONE AND ACETAMINOPHEN PRN TAB: 5; 325 TABLET ORAL at 18:58

## 2020-08-18 RX ADMIN — MORPHINE SULFATE PRN MG: 2 INJECTION, SOLUTION INTRAMUSCULAR; INTRAVENOUS at 16:06

## 2020-08-18 RX ADMIN — METRONIDAZOLE SCH MLS/HR: 5 INJECTION, SOLUTION INTRAVENOUS at 13:24

## 2020-08-18 RX ADMIN — MORPHINE SULFATE PRN MG: 2 INJECTION, SOLUTION INTRAMUSCULAR; INTRAVENOUS at 12:16

## 2020-08-18 RX ADMIN — OXYCODONE AND ACETAMINOPHEN PRN TAB: 5; 325 TABLET ORAL at 13:21

## 2020-08-18 RX ADMIN — MORPHINE SULFATE PRN MG: 2 INJECTION, SOLUTION INTRAMUSCULAR; INTRAVENOUS at 20:44

## 2020-08-18 RX ADMIN — TAMSULOSIN HYDROCHLORIDE SCH MG: 0.4 CAPSULE ORAL at 13:25

## 2020-08-18 RX ADMIN — INSULIN LISPRO SCH UNIT: 100 INJECTION, SOLUTION INTRAVENOUS; SUBCUTANEOUS at 13:20

## 2020-08-18 RX ADMIN — Medication SCH ML: at 21:04

## 2020-08-18 NOTE — CONSULTATION
History of Present Illness


Consult date: 08/18/20


Reason for consult: wound care


Requesting physician: TAHMINA CAMPBELL


Chief complaint: 





perianal pain





- History of present illness


History of present illness: 





33yo M who is well-known to our service presented to the emergency department 

with a blood sugar near 700.  General surgery was consulted for his chronic 

perianal wounds.  Patient continues to report pain in that area.  He wants pain 

meds.





Past History


Past Medical History: diabetes


Past Surgical History: bowel surgery, Other (I&D of rectal abcess and placement 

of seton drain)


Social history: single, smoking (1/2 ppd x 10 years), full code.  denies: 

alcohol abuse, prescription drug abuse, IV drug use


Family history: other (does not know family history)





Medications and Allergies


                                    Allergies











Allergy/AdvReac Type Severity Reaction Status Date / Time


 


Sulfa (Sulfonamide Allergy  Rash Verified 07/07/20 09:28





Antibiotics)     











                                Home Medications











 Medication  Instructions  Recorded  Confirmed  Last Taken  Type


 


Balsalazide Disodium [Colazal] 750 mg PO TID 06/01/20 07/02/20 Unknown History


 


Insulin Detemir [Levemir VIAL] 25 unit SQ QHS 06/01/20 07/02/20 1 Day Ago 

History





    ~07/01/20 


 


Insulin NPH/Regular [NovoLIN 70/30] 4 unit SUB-Q BID 06/01/20 07/02/20 Unknown 

History


 


OLANZapine [Zyprexa] 5 mg PO DAILY 06/01/20 07/02/20 Unknown History


 


Potassium Chloride [K-Dur] 10 meq PO BID 06/01/20 07/02/20 Unknown History


 


Tamsulosin [Flomax] 0.4 mg PO QDAY 06/01/20 07/02/20 Unknown History


 


ursodioL [Ursodiol] 300 mg PO BID 06/01/20 07/02/20 Unknown History


 


Acetaminophen [Acetaminophen TAB] 650 mg PO Q4H PRN  tablet 06/05/20 07/02/20 

Unknown Rx


 


Zinc Oxide 1 applic TP BID #7 tube 06/13/20 07/02/20 Unknown Rx


 


Loperamide [Imodium] 2 mg PO TID #30 capsule 07/05/20  Unknown Rx


 


oxyCODONE /ACETAMINOPHEN [Percocet 1 tab PO Q6H PRN #14 tablet 07/05/20  Unknown

 Rx





5/325 mg]     


 


Magnesium Oxide [Mag-Ox] 400 mg PO QDAY #14 tablet 07/15/20  Unknown Rx


 


Potassium Chloride 20 meq PO QDAY #14 packet 07/15/20  Unknown Rx











Active Meds: 


Active Medications





Acetaminophen (Tylenol)  650 mg PO Q4H PRN


   PRN Reason: Pain MILD(1-3)/Fever >100.5/HA


Dextrose (D50w (25gm) Syringe)  50 ml IV Q30MIN PRN; Protocol


   PRN Reason: Hypoglycemia


Enoxaparin Sodium (Enoxaparin)  40 mg SUB-Q QDAY@2200 VIRGIL


Metronidazole (Flagyl 500 Mg/100 Ml)  500 mg in 100 mls @ 100 mls/hr IV Q8HR 

VIRGIL; Protocol


   Stop: 08/19/20 22:59


Cefepime HCl (Cefepime/Ns 2 Gm/100 Ml)  2 gm in 100 mls @ 200 mls/hr IV Q8HR 

VIRGIL; Protocol


   Stop: 08/19/20 22:29


Sodium Chloride (Nacl 0.9% 1000 Ml)  1,000 mls @ 75 mls/hr IV AS DIRECT VIRGIL


   Stop: 08/18/20 21:34


   Last Admin: 08/18/20 08:31 Dose:  75 mls/hr


   Documented by: 


Insulin Human Lispro (Humalog)  0 unit SUB-Q Q6HR VIRGIL; Protocol


   Last Admin: 08/18/20 08:27 Dose:  4 unit


   Documented by: 


Miscellaneous Medication (Ursodiol [Ursodiol])  300 mg PO BID [facility]


Morphine Sulfate (Morphine)  2 mg IV Q4H PRN


   PRN Reason: Pain, Moderate (4-6)


   Last Admin: 08/18/20 12:16 Dose:  2 mg


   Documented by: 


Nicotine (Habitrol)  7 mg TD QDAY [facility]


Olanzapine (Zyprexa)  5 mg PO DAILY [facility]


Ondansetron HCl (Zofran)  4 mg IV Q8H PRN


   PRN Reason: Nausea And Vomiting


Oxycodone/Acetaminophen (Percocet 5/325)  1 tab PO Q6H PRN


   PRN Reason: Pain, Moderate (4-6)


Potassium Chloride (K-Dur)  40 meq PO ONCE NR


   Stop: 08/18/20 16:00


Senna (Senokot)  8.6 mg PO Q12HR VIRGIL


Sodium Chloride (Sodium Chloride Flush Syringe 10 Ml)  10 ml IV BID [facility]


Sodium Chloride (Sodium Chloride Flush Syringe 10 Ml)  10 ml IV PRN PRN


   PRN Reason: LINE FLUSH


Tamsulosin HCl (Flomax)  0.4 mg PO QDAY [facility]











Review of Systems





- Constitutional


chronic pain, no fever, no chills





- Cardiovascular


no chest pain, no shortness of breath





- Respiratory


no cough





- Gastrointestinal


diarrhea, no nausea, no vomiting, no loss of appetite





- Integumentary


wounds





Exam


                                   Vital Signs











Temp Pulse Resp BP Pulse Ox


 


 98.8 F   112 H  16   103/74   97 


 


 08/17/20 22:50  08/17/20 22:50  08/17/20 22:50  08/17/20 22:50  08/17/20 22:50














- General physical appearance


Positive: no distress, no pain, other (does not appear ill)





- Respiratory


Positive: normal expansion, normal respiratory effort





- Integumentary


other (Penrose drain is in place. Mucus is seen in the wounds. No signs of 

abscess formation. reactive erythema seen around the wound openings. )





- Neurologic


Neurologic: alert and oriented to time, place and person





- Psychiatric


Psychiatric: appropriate mood/affect





Results





- Labs





                                 08/17/20 23:41





                                08/18/20 Unknown


                              Abnormal lab results











  08/17/20 08/17/20 08/18/20 Range/Units





  23:41 23:41 00:58 


 


RBC   3.48 L   (3.65-5.03)  M/mm3


 


Hgb   9.5 L   (11.8-15.2)  gm/dl


 


Hct   29.0 L   (35.5-45.6)  %


 


MCV   83 L   (84-94)  fl


 


MCH   27 L   (28-32)  pg


 


RDW   16.0 H   (13.2-15.2)  %


 


Mono % (Auto)   11.8 H   (0.0-7.3)  %


 


Sodium     (137-145)  mmol/L


 


Potassium     (3.6-5.0)  mmol/L


 


Chloride     ()  mmol/L


 


BUN     (9-20)  mg/dL


 


Creatinine     (0.8-1.3)  mg/dL


 


Glucose     ()  mg/dL


 


POC Glucose  > 500 H    ()  


 


Hemoglobin A1c     (4-6)  %


 


Calcium     (8.4-10.2)  mg/dL


 


Magnesium    1.30 L  (1.7-2.3)  mg/dL


 


Alkaline Phosphatase     ()  units/L


 


Total Protein     (6.3-8.2)  g/dL


 


Albumin     (3.9-5)  g/dL














  08/18/20 08/18/20 08/18/20 Range/Units





  04:27 06:28 08:02 


 


RBC     (3.65-5.03)  M/mm3


 


Hgb     (11.8-15.2)  gm/dl


 


Hct     (35.5-45.6)  %


 


MCV     (84-94)  fl


 


MCH     (28-32)  pg


 


RDW     (13.2-15.2)  %


 


Mono % (Auto)     (0.0-7.3)  %


 


Sodium     (137-145)  mmol/L


 


Potassium    3.0 L  (3.6-5.0)  mmol/L


 


Chloride     ()  mmol/L


 


BUN    3 L  (9-20)  mg/dL


 


Creatinine    0.6 L  (0.8-1.3)  mg/dL


 


Glucose    202 H  ()  mg/dL


 


POC Glucose  430 H  187 H   ()  


 


Hemoglobin A1c     (4-6)  %


 


Calcium    8.0 L  (8.4-10.2)  mg/dL


 


Magnesium     (1.7-2.3)  mg/dL


 


Alkaline Phosphatase    567 H  ()  units/L


 


Total Protein    5.6 L  (6.3-8.2)  g/dL


 


Albumin    2.3 L  (3.9-5)  g/dL














  08/18/20 08/18/20 08/18/20 Range/Units





  08:02 08:29 12:03 


 


RBC     (3.65-5.03)  M/mm3


 


Hgb     (11.8-15.2)  gm/dl


 


Hct     (35.5-45.6)  %


 


MCV     (84-94)  fl


 


MCH     (28-32)  pg


 


RDW     (13.2-15.2)  %


 


Mono % (Auto)     (0.0-7.3)  %


 


Sodium     (137-145)  mmol/L


 


Potassium     (3.6-5.0)  mmol/L


 


Chloride     ()  mmol/L


 


BUN     (9-20)  mg/dL


 


Creatinine     (0.8-1.3)  mg/dL


 


Glucose     ()  mg/dL


 


POC Glucose   202 H  274 H  ()  


 


Hemoglobin A1c  14.0 H    (4-6)  %


 


Calcium     (8.4-10.2)  mg/dL


 


Magnesium     (1.7-2.3)  mg/dL


 


Alkaline Phosphatase     ()  units/L


 


Total Protein     (6.3-8.2)  g/dL


 


Albumin     (3.9-5)  g/dL














  08/18/20 Range/Units





  Unknown 


 


RBC   (3.65-5.03)  M/mm3


 


Hgb   (11.8-15.2)  gm/dl


 


Hct   (35.5-45.6)  %


 


MCV   (84-94)  fl


 


MCH   (28-32)  pg


 


RDW   (13.2-15.2)  %


 


Mono % (Auto)   (0.0-7.3)  %


 


Sodium  126 L  (137-145)  mmol/L


 


Potassium  3.0 L  (3.6-5.0)  mmol/L


 


Chloride  83.7 L  ()  mmol/L


 


BUN  4 L  (9-20)  mg/dL


 


Creatinine   (0.8-1.3)  mg/dL


 


Glucose  671 H*  ()  mg/dL


 


POC Glucose   ()  


 


Hemoglobin A1c   (4-6)  %


 


Calcium   (8.4-10.2)  mg/dL


 


Magnesium   (1.7-2.3)  mg/dL


 


Alkaline Phosphatase   ()  units/L


 


Total Protein   (6.3-8.2)  g/dL


 


Albumin   (3.9-5)  g/dL








                                 Diabetes panel











  08/18/20 08/18/20 08/18/20 Range/Units





  08:02 08:02 Unknown 


 


Sodium  138  D   126 L  (137-145)  mmol/L


 


Potassium  3.0 L   3.0 L  (3.6-5.0)  mmol/L


 


Chloride  102.6   83.7 L  ()  mmol/L


 


Carbon Dioxide  26   28  (22-30)  mmol/L


 


BUN  3 L   4 L  (9-20)  mg/dL


 


Creatinine  0.6 L   0.8  (0.8-1.3)  mg/dL


 


Glucose  202 H   671 H*  ()  mg/dL


 


Hemoglobin A1c   14.0 H   (4-6)  %


 


Calcium  8.0 L   8.8  (8.4-10.2)  mg/dL


 


AST  31    (5-40)  units/L


 


ALT  19    (7-56)  units/L


 


Alkaline Phosphatase  567 H    ()  units/L


 


Total Protein  5.6 L    (6.3-8.2)  g/dL


 


Albumin  2.3 L    (3.9-5)  g/dL








                                  Calcium panel











  08/18/20 08/18/20 Range/Units





  08:02 Unknown 


 


Calcium  8.0 L  8.8  (8.4-10.2)  mg/dL


 


Albumin  2.3 L   (3.9-5)  g/dL








                                 Pituitary panel











  08/18/20 08/18/20 Range/Units





  08:02 Unknown 


 


Sodium  138  D  126 L  (137-145)  mmol/L


 


Potassium  3.0 L  3.0 L  (3.6-5.0)  mmol/L


 


Chloride  102.6  83.7 L  ()  mmol/L


 


Carbon Dioxide  26  28  (22-30)  mmol/L


 


BUN  3 L  4 L  (9-20)  mg/dL


 


Creatinine  0.6 L  0.8  (0.8-1.3)  mg/dL


 


Glucose  202 H  671 H*  ()  mg/dL


 


Calcium  8.0 L  8.8  (8.4-10.2)  mg/dL








                                  Adrenal panel











  08/18/20 08/18/20 Range/Units





  08:02 Unknown 


 


Sodium  138  D  126 L  (137-145)  mmol/L


 


Potassium  3.0 L  3.0 L  (3.6-5.0)  mmol/L


 


Chloride  102.6  83.7 L  ()  mmol/L


 


Carbon Dioxide  26  28  (22-30)  mmol/L


 


BUN  3 L  4 L  (9-20)  mg/dL


 


Creatinine  0.6 L  0.8  (0.8-1.3)  mg/dL


 


Glucose  202 H  671 H*  ()  mg/dL


 


Calcium  8.0 L  8.8  (8.4-10.2)  mg/dL


 


Total Bilirubin  0.30   (0.1-1.2)  mg/dL


 


AST  31   (5-40)  units/L


 


ALT  19   (7-56)  units/L


 


Alkaline Phosphatase  567 H   ()  units/L


 


Total Protein  5.6 L   (6.3-8.2)  g/dL


 


Albumin  2.3 L   (3.9-5)  g/dL














Assessment and Plan





- Patient Problems


(1) Open wound of perineum


Current Visit: No   Status: Acute   


Plan to address problem: 


This is been a chronic issue for a number of months now.  There is no evidence 

of infection.  He has multiple wounds that are not being adequately cared for.  

We have advised him on multiple occasions of the importance of doing frequent 

sitz baths and not sitting in stool.  Every time we have seen him in the 

hospital and in the clinic, he is sitting in stool.  His pain is as a result of 

irritation of the wounds from the chronic stool exposure.  We have offered him a

 diverting colostomy to help in healing the wounds, but he has refused.  At this

 point, all that is needed is to keep the area clean and dry.  He is to do 

frequent sitz baths daily.  We will add Imodium to his medication list in hopes 

of decreasing the liquid stools.





Please call with any questions.

## 2020-08-18 NOTE — HISTORY AND PHYSICAL REPORT
<YULISSA SIERRA - Last Filed: 08/18/20 15:29>





History of Present Illness


Date of examination: 08/18/20


Date of admission: 


08/18/20 02:51





Chief complaint: 





Perirectal pain and greenish drainage for 3 days


History of present illness: 


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

seton placement in July 2020, DM, 1/2 ppd smoker x10 years and ? Depression who 

presents to [facility] with pain and greenish and malodorus drainage from his 

perirectal abscess and fecal incontinence for 3 to 4 days. He was recently a

dmitted on 7/16/2020 for rectal abscess and at that time refused a diverting 

colostomy and was discharged with home health care for wound care.  He is a 

resident of a prison house recently was released from prison.  He denies any 

nausea or vomiting.  Fevers, chills, cough, hemoptysis, chest pain, recent sick 

contacts, or travel.  Patient denies any known recent exposure to COVID-19. In 

the emergency department he was found with hyperglycemia (),  hypokalemia 

(3), hypomagnesemia (1.8), hyponatremia (126).  In the emergency department he 

received 40meq KCl PO, 10meq KCl IV, 3 L normal saline, 10 units of insulin, 2 g

of magnesium and Zosyn x1 dose. Dr. Chavez was consulted in ED. He will be 

admitted under the hospitalist service for a draining fistula, electrolyte 

imbalances and hyperglycemia and initiated on antibiotics and electrolyte 

replacement as needed. ID was also consulted. 











Past History


Past Medical History: diabetes


Past Surgical History: Other (I&D of rectal abcess and placement of seton drain)


Social history: single, smoking (1/2 ppd x 10 years), full code.  denies: 

alcohol abuse, prescription drug abuse, IV drug use


Family history: other (does not know family history)





Medications and Allergies


                                    Allergies











Allergy/AdvReac Type Severity Reaction Status Date / Time


 


Sulfa (Sulfonamide Allergy  Rash Verified 07/07/20 09:28





Antibiotics)     











                                Home Medications











 Medication  Instructions  Recorded  Confirmed  Last Taken  Type


 


Balsalazide Disodium [Colazal] 750 mg PO TID 06/01/20 07/02/20 Unknown History


 


Insulin Detemir [Levemir VIAL] 25 unit SQ QHS 06/01/20 07/02/20 1 Day Ago 

History





    ~07/01/20 


 


Insulin NPH/Regular [NovoLIN 70/30] 4 unit SUB-Q BID 06/01/20 07/02/20 Unknown 

History


 


OLANZapine [Zyprexa] 5 mg PO DAILY 06/01/20 07/02/20 Unknown History


 


Potassium Chloride [K-Dur] 10 meq PO BID 06/01/20 07/02/20 Unknown History


 


Tamsulosin [Flomax] 0.4 mg PO QDAY 06/01/20 07/02/20 Unknown History


 


ursodioL [Ursodiol] 300 mg PO BID 06/01/20 07/02/20 Unknown History


 


Acetaminophen [Acetaminophen TAB] 650 mg PO Q4H PRN  tablet 06/05/20 07/02/20 

Unknown Rx


 


Zinc Oxide 1 applic TP BID #7 tube 06/13/20 07/02/20 Unknown Rx


 


Loperamide [Imodium] 2 mg PO TID #30 capsule 07/05/20  Unknown Rx


 


oxyCODONE /ACETAMINOPHEN [Percocet 1 tab PO Q6H PRN #14 tablet 07/05/20  Unknown

 Rx





5/325 mg]     


 


Magnesium Oxide [Mag-Ox] 400 mg PO QDAY #14 tablet 07/15/20  Unknown Rx


 


Potassium Chloride 20 meq PO QDAY #14 packet 07/15/20  Unknown Rx











Active Meds: 


Active Medications





Acetaminophen (Tylenol)  650 mg PO Q4H PRN


   PRN Reason: Pain MILD(1-3)/Fever >100.5/HA


Dextrose (D50w (25gm) Syringe)  50 ml IV Q30MIN PRN; Protocol


   PRN Reason: Hypoglycemia


Docusate Sodium (Colace)  100 mg PO BID [facility]


Insulin Human Lispro (Humalog)  0 unit SUB-Q Q6HR VIRGIL; Protocol


Miscellaneous Medication (Ursodiol [Ursodiol])  300 mg PO BID [facility]


Morphine Sulfate (Morphine)  2 mg IV Q4H PRN


   PRN Reason: Pain, Moderate (4-6)


Olanzapine (Zyprexa)  5 mg PO DAILY [facility]


Ondansetron HCl (Zofran)  4 mg IV Q8H PRN


   PRN Reason: Nausea And Vomiting


Oxycodone/Acetaminophen (Percocet 5/325)  1 tab PO Q6H PRN


   PRN Reason: Pain, Moderate (4-6)


Senna (Senokot)  8.6 mg PO Q12HR [facility]


Sodium Chloride (Sodium Chloride Flush Syringe 10 Ml)  10 ml IV BID [facility]


Sodium Chloride (Sodium Chloride Flush Syringe 10 Ml)  10 ml IV PRN PRN


   PRN Reason: LINE FLUSH


Tamsulosin HCl (Flomax)  0.4 mg PO QDAY [facility]











Review of Systems


Constitutional: no weight loss, no weight gain, no fever, no chills, no sweats, 

no night sweats, no anorexia, no fatigue, no weakness, no lethargy


Ears, nose, mouth and throat: deferred, no ear pain, no ear discharge, no 

tinnitis, no decreased hearing, no nose pain, no nasal congestion, no nasal 

discharge, no sinus pressure


Cardiovascular: no chest pain, no orthopnea, no palpitations, no rapid/irregular

 heart beat, no edema, no syncope, no lightheadedness, no shortness of breath, 

no dyspnea on exertion, no high blood pressure, no leg edema


Respiratory: no cough, no cough with sputum, no excessive sputum, no hemoptysis,

 no shortness of breath, no dyspnea on exertion, no congestion, no wheezing, no 

home oxygen


Gastrointestinal: diarrhea, other, no abdominal pain, no nausea, no vomiting, no

 constipation


Genitourinary Male: no dysuria, no hematuria, no discharge, no urinary frequ

ency, no urinary hesitancy, no nocturia


Rectal: pain, incontinence (rectal pain with greenish and malodorus drainage and

 small amount of blood streaks noted in drainge), no itching, no hemorrhoids


Musculoskeletal: no neck stiffness, no neck pain, no shooting arm pain, no arm 

numbness/tingling, no low back pain, no shooting leg pain, no leg numbness

/tingling, no frequent falls


Integumentary: no rash, no pruritis, no redness, no sores


Neurological: no head injury, no transient paralysis, no paralysis, no weakness,

 no parathesias, no numbness, no tingling, no vertigo


Psychiatric: depression (diagnosed at [facility]), no anxiety, no 

memory loss


Endocrine: no cold intolerance, no heat intolerance, no polyphagia, no excessive

 thirst, no polydipsia, no polyuria, no nocturia


Hematologic/Lymphatic: no easy bruising, no easy bleeding


Allergic/Immunologic: no urticaria, no allergic rhinitis, no wheezing, no 

persistent infections





Exam





- Constitutional


Vitals: 


                                        











Temp Pulse Resp BP Pulse Ox


 


 98.1 F   86   16   98/59   99 


 


 08/18/20 01:13  08/18/20 01:13  08/18/20 06:36  08/18/20 06:30  08/18/20 06:30











General appearance: Present: no acute distress





- EENT


Eyes: Present: PERRL, EOM intact


ENT: hearing intact, poor dentition





- Neck


Neck: Present: supple, normal ROM





- Respiratory


Respiratory effort: normal


Respiratory: bilateral: wheezing (lower expiratory wheezes)





- Cardiovascular


Rhythm: regular


Heart Sounds: Present: S1 & S2.  Absent: gallop, systolic murmur, diastolic 

murmur





- Extremities


Extremities: no ischemia, pulses intact, pulses symmetrical, No edema, normal 

temperature, normal color, Full ROM


Peripheral Pulses: within normal limits





- Abdominal


General gastrointestinal: Present: soft, non-tender, non-distended, normal bowel

 sounds


Male genitourinary: Absent: urethral discharge





- Rectal


Rectal Exam: tenderness, other (brown drainage from perirectal drain)





- Integumentary


Integumentary: Present: clear, warm, dry





- Musculoskeletal


Musculoskeletal: strength equal bilaterally





- Psychiatric


Psychiatric: cooperative





- Neurologic


Neurologic: CNII-XII intact, no focal deficits, moves all extremities





- Allied Health


Allied health notes reviewed: case management





HEART Score





- HEART Score


Troponin: 


                                        











WBC  5.9 K/mm3 (4.5-11.0)   08/17/20  23:41    


 


RBC  3.48 M/mm3 (3.65-5.03)  L  08/17/20  23:41    


 


Hgb  9.5 gm/dl (11.8-15.2)  L  08/17/20  23:41    


 


Hct  29.0 % (35.5-45.6)  L  08/17/20  23:41    


 


MCV  83 fl (84-94)  L  08/17/20  23:41    


 


MCH  27 pg (28-32)  L  08/17/20  23:41    


 


MCHC  33 % (32-34)   08/17/20  23:41    


 


RDW  16.0 % (13.2-15.2)  H  08/17/20  23:41    


 


Plt Count  213 K/mm3 (140-440)   08/17/20  23:41    


 


Lymph % (Auto)  26.1 % (13.4-35.0)   08/17/20  23:41    


 


Mono % (Auto)  11.8 % (0.0-7.3)  H  08/17/20  23:41    


 


Eos % (Auto)  0.7 % (0.0-4.3)   08/17/20  23:41    


 


Baso % (Auto)  0.2 % (0.0-1.8)   08/17/20  23:41    


 


Lymph #  1.5 K/mm3 (1.2-5.4)   08/17/20  23:41    


 


Mono #  0.7 K/mm3 (0.0-0.8)   08/17/20  23:41    


 


Eos #  0.0 K/mm3 (0.0-0.4)   08/17/20  23:41    


 


Baso #  0.0 K/mm3 (0.0-0.1)   08/17/20  23:41    


 


Seg Neutrophils %  61.2 % (40.0-70.0)   08/17/20  23:41    


 


Seg Neutrophils #  3.6 K/mm3 (1.8-7.7)   08/17/20  23:41    


 


Sodium  126 mmol/L (137-145)  L  08/18/20  Unknown


 


Potassium  3.0 mmol/L (3.6-5.0)  L  08/18/20  Unknown


 


Chloride  83.7 mmol/L ()  L  08/18/20  Unknown


 


Carbon Dioxide  28 mmol/L (22-30)   08/18/20  Unknown


 


Anion Gap  17 mmol/L  08/18/20  Unknown


 


BUN  4 mg/dL (9-20)  L  08/18/20  Unknown


 


Creatinine  0.8 mg/dL (0.8-1.3)   08/18/20  Unknown


 


Estimated GFR  > 60 ml/min  08/18/20  Unknown


 


BUN/Creatinine Ratio  5 %  08/18/20  Unknown


 


Glucose  671 mg/dL ()  H*  08/18/20  Unknown


 


POC Glucose  187  ()  H  08/18/20  06:28    


 


Calcium  8.8 mg/dL (8.4-10.2)   08/18/20  Unknown


 


Magnesium  1.30 mg/dL (1.7-2.3)  L  08/18/20  00:58    


 


Urine Color  Colorless  (Yellow)   08/18/20  01:13    


 


Urine Turbidity  Clear  (Clear)   08/18/20  01:13    


 


Urine pH  6.0  (5.0-7.0)   08/18/20  01:13    


 


Ur Specific Gravity  1.024  (1.003-1.030)   08/18/20  01:13    


 


Urine Protein  <15 mg/dl mg/dL (Negative)   08/18/20  01:13    


 


Urine Glucose (UA)  >=500 mg/dL (Negative)   08/18/20  01:13    


 


Urine Ketones  Neg mg/dL (Negative)   08/18/20  01:13    


 


Urine Blood  Neg  (Negative)   08/18/20  01:13    


 


Urine Nitrite  Neg  (Negative)   08/18/20  01:13    


 


Urine Bilirubin  Neg  (Negative)   08/18/20  01:13    


 


Urine Urobilinogen  < 2.0 mg/dL (<2.0)   08/18/20  01:13    


 


Ur Leukocyte Esterase  Neg  (Negative)   08/18/20  01:13    


 


Urine WBC (Auto)  < 1.0 /HPF (0.0-6.0)   08/18/20  01:13    


 


Urine RBC (Auto)  < 1.0 /HPF (0.0-6.0)   08/18/20  01:13    


 


U Epithel Cells (Auto)  < 1.0 /HPF (0-13.0)   08/18/20  01:13    


 


Urine Mucus  Few /HPF  08/18/20  01:13    














Results





- Labs


CBC & Chem 7: 


                                 08/17/20 23:41





                                08/18/20 Unknown


Labs: 


                             Laboratory Last Values











WBC  5.9 K/mm3 (4.5-11.0)   08/17/20  23:41    


 


RBC  3.48 M/mm3 (3.65-5.03)  L  08/17/20  23:41    


 


Hgb  9.5 gm/dl (11.8-15.2)  L  08/17/20  23:41    


 


Hct  29.0 % (35.5-45.6)  L  08/17/20  23:41    


 


MCV  83 fl (84-94)  L  08/17/20  23:41    


 


MCH  27 pg (28-32)  L  08/17/20  23:41    


 


MCHC  33 % (32-34)   08/17/20  23:41    


 


RDW  16.0 % (13.2-15.2)  H  08/17/20  23:41    


 


Plt Count  213 K/mm3 (140-440)   08/17/20  23:41    


 


Lymph % (Auto)  26.1 % (13.4-35.0)   08/17/20  23:41    


 


Mono % (Auto)  11.8 % (0.0-7.3)  H  08/17/20  23:41    


 


Eos % (Auto)  0.7 % (0.0-4.3)   08/17/20  23:41    


 


Baso % (Auto)  0.2 % (0.0-1.8)   08/17/20  23:41    


 


Lymph #  1.5 K/mm3 (1.2-5.4)   08/17/20  23:41    


 


Mono #  0.7 K/mm3 (0.0-0.8)   08/17/20  23:41    


 


Eos #  0.0 K/mm3 (0.0-0.4)   08/17/20  23:41    


 


Baso #  0.0 K/mm3 (0.0-0.1)   08/17/20  23:41    


 


Seg Neutrophils %  61.2 % (40.0-70.0)   08/17/20  23:41    


 


Seg Neutrophils #  3.6 K/mm3 (1.8-7.7)   08/17/20  23:41    


 


Sodium  126 mmol/L (137-145)  L  08/18/20  Unknown


 


Potassium  3.0 mmol/L (3.6-5.0)  L  08/18/20  Unknown


 


Chloride  83.7 mmol/L ()  L  08/18/20  Unknown


 


Carbon Dioxide  28 mmol/L (22-30)   08/18/20  Unknown


 


Anion Gap  17 mmol/L  08/18/20  Unknown


 


BUN  4 mg/dL (9-20)  L  08/18/20  Unknown


 


Creatinine  0.8 mg/dL (0.8-1.3)   08/18/20  Unknown


 


Estimated GFR  > 60 ml/min  08/18/20  Unknown


 


BUN/Creatinine Ratio  5 %  08/18/20  Unknown


 


Glucose  671 mg/dL ()  H*  08/18/20  Unknown


 


POC Glucose  187  ()  H  08/18/20  06:28    


 


Calcium  8.8 mg/dL (8.4-10.2)   08/18/20  Unknown


 


Magnesium  1.30 mg/dL (1.7-2.3)  L  08/18/20  00:58    


 


Urine Color  Colorless  (Yellow)   08/18/20  01:13    


 


Urine Turbidity  Clear  (Clear)   08/18/20  01:13    


 


Urine pH  6.0  (5.0-7.0)   08/18/20  01:13    


 


Ur Specific Gravity  1.024  (1.003-1.030)   08/18/20  01:13    


 


Urine Protein  <15 mg/dl mg/dL (Negative)   08/18/20  01:13    


 


Urine Glucose (UA)  >=500 mg/dL (Negative)   08/18/20  01:13    


 


Urine Ketones  Neg mg/dL (Negative)   08/18/20  01:13    


 


Urine Blood  Neg  (Negative)   08/18/20  01:13    


 


Urine Nitrite  Neg  (Negative)   08/18/20  01:13    


 


Urine Bilirubin  Neg  (Negative)   08/18/20  01:13    


 


Urine Urobilinogen  < 2.0 mg/dL (<2.0)   08/18/20  01:13    


 


Ur Leukocyte Esterase  Neg  (Negative)   08/18/20  01:13    


 


Urine WBC (Auto)  < 1.0 /HPF (0.0-6.0)   08/18/20  01:13    


 


Urine RBC (Auto)  < 1.0 /HPF (0.0-6.0)   08/18/20  01:13    


 


U Epithel Cells (Auto)  < 1.0 /HPF (0-13.0)   08/18/20  01:13    


 


Urine Mucus  Few /HPF  08/18/20  01:13    











Senior/IV: 


                                        





IV Catheter Type [Left Forearm   INT / Saline Lock


]                                











Assessment and Plan


VTE prophylaxis?: Chemical, Mechanical


Plan of care discussed with patient/family: Yes





- Patient Problems


(1) Hyperglycemia


Current Visit: No   Status: Acute   


Plan to address problem: 


-BG on presentation to [facility]


-Received 10 units of insulin in the emergency department


-Sliding scale insulin


-Accu-Cheks every 6


-Hypoglycemia protocol


-Hemoglobin A1c pending


-No surgical intervention recommended by surgery therefore cc diet started








(2) Perirectal abscess


Current Visit: Yes   Status: Acute   


Plan to address problem: 


-Surgical consult requested


-Infectious disease consult requested


-Patient recently admitted July 2020 s/p I&D of left perineal abscess and seton 

placement; he refused a diverting colostomy and was discharged to a personal 

care home with dressing changes at home.


-Received 1 dose of Zosyn in the emergency department


-IV antibiotics: Flagyl and cefepime


-8/18: Wound culture pending


-WOCN consulted


-8/17 UA (-)


-Per surgery needed is to keep the area clean and dry, sitz bath daily and 

Imodium to his medication list in hopes of decreasing the liquid stool








(3) Hypokalemia


Current Visit: Yes   Status: Acute   


Plan to address problem: 


-Presented with a potassium of 3


-Repleated in the emergency department with 40 elicia PO KCl, 10 meq KCL IV


-CMP pending 


-Replete as necessary








(4) Hypomagnesemia


Current Visit: Yes   Status: Acute   


Plan to address problem: 


- Repleted in the ED with 2g Mag Sulfate


- CMP pending


- Replete as needeed








(5) IDDM (insulin dependent diabetes mellitus)


Current Visit: No   Status: Chronic   


Plan to address problem: 


- Home medication includes NPH/regular insulin 4 units twice daily and Levemir 

25 units at bedtime


- Presented with , received 10 units of insulin in the emergency 

department


- SSI


- Hypoglycemia protocol


- Accuchek q 6 hours


- CC diet 








(6) Tobacco abuse


Current Visit: Yes   Status: Chronic   


Plan to address problem: 


- Nicoderm TD


- Smoking cessation counseling








(7) DVT prophylaxis


Current Visit: No   Status: Acute   


Plan to address problem: 


-SCDs to bilateral lower extremities while in bed


-Lovenox subcu








(8) Full code status


Current Visit: No   Status: Acute   





<ED GANT R - Last Filed: 08/19/20 09:31>





History of Present Illness


Date of admission: 


08/18/20 02:51








Medications and Allergies


Active Meds: 


Active Medications





Acetaminophen (Tylenol)  650 mg PO Q4H PRN


   PRN Reason: Pain MILD(1-3)/Fever >100.5/HA


Dextrose (D50w (25gm) Syringe)  50 ml IV Q30MIN PRN; Protocol


   PRN Reason: Hypoglycemia


Enoxaparin Sodium (Enoxaparin)  40 mg SUB-Q QDAY@2200 VIRGIL


   Last Admin: 08/18/20 21:02 Dose:  40 mg


   Documented by: 


Potassium Chloride (Kcl 10meq/100ml)  10 meq in 100 mls @ 100 mls/hr IV ONCE ONE


   Stop: 08/19/20 10:59


Magnesium Sulfate (Magnesium Sulfate 2gm/50ml)  2 gm in 50 mls @ 25 mls/hr IV 

ONCE ONE


   Stop: 08/19/20 12:29


Sodium Chloride (Nacl 0.9% 1000 Ml)  1,000 mls @ 100 mls/hr IV AS DIRECT [facility]


Insulin Glargine (Lantus)  25 units SUB-Q QHS [facility]


   Last Admin: 08/18/20 23:02 Dose:  25 units


   Documented by: 


Insulin Human Lispro (Humalog)  0 unit SUB-Q ACHS [facility]; Protocol


   Last Admin: 08/19/20 08:30 Dose:  Not Given


   Documented by: 


Loperamide HCl (Imodium)  2 mg PO BID [facility]


   Last Admin: 08/18/20 21:03 Dose:  2 mg


   Documented by: 


Miscellaneous Medication (Ursodiol [Ursodiol])  300 mg PO BID [facility]


Morphine Sulfate (Morphine)  2 mg IV Q4H PRN


   PRN Reason: Pain, Moderate (4-6)


   Last Admin: 08/18/20 20:44 Dose:  2 mg


   Documented by: 


Nicotine (Habitrol)  7 mg TD QDAY [facility]


   Last Admin: 08/18/20 13:24 Dose:  7 mg


   Documented by: 


Olanzapine (Zyprexa)  5 mg PO DAILY [facility]


   Last Admin: 08/18/20 13:20 Dose:  5 mg


   Documented by: 


Ondansetron HCl (Zofran)  4 mg IV Q8H PRN


   PRN Reason: Nausea And Vomiting


Oxycodone/Acetaminophen (Percocet 5/325)  1 tab PO Q6H PRN


   PRN Reason: Pain, Moderate (4-6)


   Last Admin: 08/19/20 04:24 Dose:  1 tab


   Documented by: 


Potassium Chloride (K-Dur)  40 meq PO QDAY [facility]


Potassium Chloride (K-Dur)  40 meq PO ONCE ONE


   Stop: 08/19/20 09:27


Sodium Chloride (Sodium Chloride Flush Syringe 10 Ml)  10 ml IV BID [facility]


   Last Admin: 08/18/20 21:04 Dose:  10 ml


   Documented by: 


Sodium Chloride (Sodium Chloride Flush Syringe 10 Ml)  10 ml IV PRN PRN


   PRN Reason: LINE FLUSH


Tamsulosin HCl (Flomax)  0.4 mg PO QDAY [facility]


   Last Admin: 08/18/20 13:25 Dose:  0.4 mg


   Documented by: 











Exam





- Constitutional


Vitals: 


                                        











Temp Pulse Resp BP Pulse Ox


 


 98.0 F   74   18   97/66   96 


 


 08/19/20 05:01  08/19/20 05:01  08/19/20 05:01  08/19/20 05:01  08/19/20 05:01














Results





- Labs


CBC & Chem 7: 


                                 08/19/20 04:11





                                 08/19/20 04:11


Labs: 


                             Laboratory Last Values











WBC  4.2 K/mm3 (4.5-11.0)  L  08/19/20  04:11    


 


RBC  3.30 M/mm3 (3.65-5.03)  L  08/19/20  04:11    


 


Hgb  8.8 gm/dl (11.8-15.2)  L  08/19/20  04:11    


 


Hct  27.6 % (35.5-45.6)  L  08/19/20  04:11    


 


MCV  83 fl (84-94)  L  08/19/20  04:11    


 


MCH  27 pg (28-32)  L  08/19/20  04:11    


 


MCHC  32 % (32-34)   08/19/20  04:11    


 


RDW  16.3 % (13.2-15.2)  H  08/19/20  04:11    


 


Plt Count  198 K/mm3 (140-440)   08/19/20  04:11    


 


Lymph % (Auto)  30.3 % (13.4-35.0)   08/19/20  04:11    


 


Mono % (Auto)  9.1 % (0.0-7.3)  H  08/19/20  04:11    


 


Eos % (Auto)  1.7 % (0.0-4.3)   08/19/20  04:11    


 


Baso % (Auto)  0.6 % (0.0-1.8)   08/19/20  04:11    


 


Lymph #  1.3 K/mm3 (1.2-5.4)   08/19/20  04:11    


 


Mono #  0.4 K/mm3 (0.0-0.8)   08/19/20  04:11    


 


Eos #  0.1 K/mm3 (0.0-0.4)   08/19/20  04:11    


 


Baso #  0.0 K/mm3 (0.0-0.1)   08/19/20  04:11    


 


Seg Neutrophils %  58.3 % (40.0-70.0)   08/19/20  04:11    


 


Seg Neutrophils #  2.4 K/mm3 (1.8-7.7)   08/19/20  04:11    


 


Sodium  144 mmol/L (137-145)   08/19/20  04:11    


 


Potassium  3.1 mmol/L (3.6-5.0)  L  08/19/20  04:11    


 


Chloride  107.5 mmol/L ()  H  08/19/20  04:11    


 


Carbon Dioxide  25 mmol/L (22-30)   08/19/20  04:11    


 


Anion Gap  15 mmol/L  08/19/20  04:11    


 


BUN  4 mg/dL (9-20)  L  08/19/20  04:11    


 


Creatinine  0.6 mg/dL (0.8-1.3)  L  08/19/20  04:11    


 


Estimated GFR  > 60 ml/min  08/19/20  04:11    


 


BUN/Creatinine Ratio  7 %  08/19/20  04:11    


 


Glucose  177 mg/dL ()  H  08/19/20  04:11    


 


POC Glucose  129  ()  H  08/19/20  08:04    


 


Hemoglobin A1c  14.0 % (4-6)  H  08/18/20  08:02    


 


Lactic Acid  1.60 mmol/L (0.7-2.0)   08/18/20  08:58    


 


Calcium  8.1 mg/dL (8.4-10.2)  L  08/19/20  04:11    


 


Magnesium  1.10 mg/dL (1.7-2.3)  L  08/19/20  04:11    


 


Total Bilirubin  0.30 mg/dL (0.1-1.2)   08/18/20  08:02    


 


AST  31 units/L (5-40)   08/18/20  08:02    


 


ALT  19 units/L (7-56)   08/18/20  08:02    


 


Alkaline Phosphatase  567 units/L ()  H  08/18/20  08:02    


 


Total Protein  5.6 g/dL (6.3-8.2)  L  08/18/20  08:02    


 


Albumin  2.3 g/dL (3.9-5)  L  08/18/20  08:02    


 


Albumin/Globulin Ratio  0.7 %  08/18/20  08:02    


 


Urine Color  Colorless  (Yellow)   08/18/20  01:13    


 


Urine Turbidity  Clear  (Clear)   08/18/20  01:13    


 


Urine pH  6.0  (5.0-7.0)   08/18/20  01:13    


 


Ur Specific Gravity  1.024  (1.003-1.030)   08/18/20  01:13    


 


Urine Protein  <15 mg/dl mg/dL (Negative)   08/18/20  01:13    


 


Urine Glucose (UA)  >=500 mg/dL (Negative)   08/18/20  01:13    


 


Urine Ketones  Neg mg/dL (Negative)   08/18/20  01:13    


 


Urine Blood  Neg  (Negative)   08/18/20  01:13    


 


Urine Nitrite  Neg  (Negative)   08/18/20  01:13    


 


Urine Bilirubin  Neg  (Negative)   08/18/20  01:13    


 


Urine Urobilinogen  < 2.0 mg/dL (<2.0)   08/18/20  01:13    


 


Ur Leukocyte Esterase  Neg  (Negative)   08/18/20  01:13    


 


Urine WBC (Auto)  < 1.0 /HPF (0.0-6.0)   08/18/20  01:13    


 


Urine RBC (Auto)  < 1.0 /HPF (0.0-6.0)   08/18/20  01:13    


 


U Epithel Cells (Auto)  < 1.0 /HPF (0-13.0)   08/18/20  01:13    


 


Urine Mucus  Few /HPF  08/18/20  01:13    











Senior/IV: 


                                        





Voiding Method                   Urinal


IV Catheter Type [Left Forearm   INT / Saline Lock


]                                











Assessment and Plan


Assessment and plan: 





I saw and evaluated the patient. I agree with the findings and the plan of care 

as documented in the Nurse Practitioner's~note.

## 2020-08-18 NOTE — CONSULTATION
History of Present Illness





- Reason for Consult


Consult date: 08/18/20


Jessica-rectal abscess


Requesting physician: YULISSA SIERRA





- History of Present Illness





The patient is a 34-year-old male with perirectal abscess known to us from his 

previous hospitalizations in June and May 2020.  He underwent an I&D and seton 

drain placement by general surgery in June 2020, and another washout during that

time, treated with IV and PO abx in the past.  Patient has reportedly been quite

noncompliant with regards to wound care and sits baths and frequent fecal 

contamination of his wound.  He returned to the hospital last night with 

complaints of perirectal pain and was noted to be severely hyperglycemic with 

blood sugar more than 500.  No fever or leukocytosis.  He has been evaluated by 

general surgery again who have recommended continued wound care and have also 

offered him a diverting colostomy which he has continued to refuse in the past 

as well as this admission.  Infectious diseases was consulted to evaluate for 

antibiotics.





Review of Systems: 


General: no fevers,chills or rigors


HEENT: no new visual disturbance


Respiratory: No cough, sputum, hemoptysis or shortness of breath


Cardiovascular: No chest pain, syncope


Genitourinary: No dysuria or hematuria


Musculoskeletal: No new or worsening neck pain or back pain 


Neurologic: No headaches, seizures


Hematologic: No easy bruising or bleeding


Endocrine: No night sweats or acute weight loss


Skin: negative for rash, jaundice


Psychiatric: No suicidal or homicidal ideation





Past History


Past Medical History: diabetes


Past Surgical History: Other (I&D of rectal abcess and placement of seton drain)


Social history: single, smoking (1/2 ppd x 10 years), full code.  denies: alcoho

l abuse, prescription drug abuse, IV drug use


Family history: other (does not know family history)





Medications and Allergies


                                    Allergies











Allergy/AdvReac Type Severity Reaction Status Date / Time


 


Sulfa (Sulfonamide Allergy  Rash Verified 07/07/20 09:28





Antibiotics)     











                                Home Medications











 Medication  Instructions  Recorded  Confirmed  Last Taken  Type


 


Balsalazide Disodium [Colazal] 750 mg PO TID 06/01/20 07/02/20 Unknown History


 


Insulin Detemir [Levemir VIAL] 25 unit SQ QHS 06/01/20 07/02/20 1 Day Ago 

History





    ~07/01/20 


 


Insulin NPH/Regular [NovoLIN 70/30] 4 unit SUB-Q BID 06/01/20 07/02/20 Unknown 

History


 


OLANZapine [Zyprexa] 5 mg PO DAILY 06/01/20 07/02/20 Unknown History


 


Potassium Chloride [K-Dur] 10 meq PO BID 06/01/20 07/02/20 Unknown History


 


Tamsulosin [Flomax] 0.4 mg PO QDAY 06/01/20 07/02/20 Unknown History


 


ursodioL [Ursodiol] 300 mg PO BID 06/01/20 07/02/20 Unknown History


 


Acetaminophen [Acetaminophen TAB] 650 mg PO Q4H PRN  tablet 06/05/20 07/02/20 

Unknown Rx


 


Zinc Oxide 1 applic TP BID #7 tube 06/13/20 07/02/20 Unknown Rx


 


Loperamide [Imodium] 2 mg PO TID #30 capsule 07/05/20  Unknown Rx


 


oxyCODONE /ACETAMINOPHEN [Percocet 1 tab PO Q6H PRN #14 tablet 07/05/20  Unknown

 Rx





5/325 mg]     


 


Magnesium Oxide [Mag-Ox] 400 mg PO QDAY #14 tablet 07/15/20  Unknown Rx


 


Potassium Chloride 20 meq PO QDAY #14 packet 07/15/20  Unknown Rx











Active Meds: 


Active Medications





Acetaminophen (Tylenol)  650 mg PO Q4H PRN


   PRN Reason: Pain MILD(1-3)/Fever >100.5/HA


Dextrose (D50w (25gm) Syringe)  50 ml IV Q30MIN PRN; Protocol


   PRN Reason: Hypoglycemia


Enoxaparin Sodium (Enoxaparin)  40 mg SUB-Q QDAY@2200 [facility]


Metronidazole (Flagyl 500 Mg/100 Ml)  500 mg in 100 mls @ 100 mls/hr IV Q8HR 

[facility]; Protocol


   Stop: 08/19/20 22:59


   Last Admin: 08/18/20 13:24 Dose:  100 mls/hr


   Documented by: 


Cefepime HCl (Cefepime/Ns 2 Gm/100 Ml)  2 gm in 100 mls @ 200 mls/hr IV Q8HR 

[facility]; Protocol


   Stop: 08/19/20 22:29


   Last Admin: 08/18/20 13:23 Dose:  200 mls/hr


   Documented by: 


Sodium Chloride (Nacl 0.9% 1000 Ml)  1,000 mls @ 75 mls/hr IV AS DIRECT [facility]


   Stop: 08/18/20 21:34


   Last Admin: 08/18/20 08:31 Dose:  75 mls/hr


   Documented by: 


Insulin Glargine (Lantus)  25 units SUB-Q QHS [facility]


Insulin Human Lispro (Humalog)  0 unit SUB-Q Q6HR [facility]; Protocol


   Last Admin: 08/18/20 13:20 Dose:  6 unit


   Documented by: 


Loperamide HCl (Imodium)  2 mg PO BID [facility]


   Last Admin: 08/18/20 13:21 Dose:  2 mg


   Documented by: 


Miscellaneous Medication (Ursodiol [Ursodiol])  300 mg PO BID [facility]


Morphine Sulfate (Morphine)  2 mg IV Q4H PRN


   PRN Reason: Pain, Moderate (4-6)


   Last Admin: 08/18/20 12:16 Dose:  2 mg


   Documented by: 


Nicotine (Habitrol)  7 mg TD QDAY [facility]


   Last Admin: 08/18/20 13:24 Dose:  7 mg


   Documented by: 


Olanzapine (Zyprexa)  5 mg PO DAILY [facility]


   Last Admin: 08/18/20 13:20 Dose:  5 mg


   Documented by: 


Ondansetron HCl (Zofran)  4 mg IV Q8H PRN


   PRN Reason: Nausea And Vomiting


Oxycodone/Acetaminophen (Percocet 5/325)  1 tab PO Q6H PRN


   PRN Reason: Pain, Moderate (4-6)


   Last Admin: 08/18/20 13:21 Dose:  1 tab


   Documented by: 


Potassium Chloride (K-Dur)  40 meq PO ONCE NR


   Stop: 08/18/20 16:00


   Last Admin: 08/18/20 13:21 Dose:  40 meq


   Documented by: 


Sodium Chloride (Sodium Chloride Flush Syringe 10 Ml)  10 ml IV BID [facility]


   Last Admin: 08/18/20 13:24 Dose:  10 ml


   Documented by: 


Sodium Chloride (Sodium Chloride Flush Syringe 10 Ml)  10 ml IV PRN PRN


   PRN Reason: LINE FLUSH


Tamsulosin HCl (Flomax)  0.4 mg PO QDAY [facility]


   Last Admin: 08/18/20 13:25 Dose:  0.4 mg


   Documented by: 











Physical Examination





- Physical Exam


Narrative exam: 


Physical Exam: 


Constitutional: Alert, cooperative. No acute distress. 


Head, Ears, Nose: Normocephalic, atraumatic. External ears, nose normal


Eyes: Conjunctivae/corneas clear. No icterus. No ptosis.


Neck: Supple, no meningeal signs


Oral: poor dentition


Cardiovascular: S1, S2 normal. 


Respiratory: Good air entry, clear to auscultation bilaterally


GI: Soft, non-tender; bowel sounds normal. No peritoneal signs. Perirectal wound

with Penrose drain and mucoid discharge.


Musculoskeletal: No pedal edema, no cyanosis.


Skin: No rash or abscess


Hem/Lymphatic: No palpable cervical or supraclavicular nodes. No lymphangitis


Psych: Mood ok. Affect normal


Neurological: Awake, alert, oriented. No gross abnormality





- Constitutional


Vitals: 


                                   Vital Signs











Temp Pulse Resp BP Pulse Ox


 


 98.1 F   83   17   69/29   90 


 


 08/18/20 01:13  08/18/20 08:20  08/18/20 08:20  08/18/20 08:20  08/18/20 08:20








                           Temperature -Last 24 Hours











Temperature                    98.1 F


 


Temperature                    98.8 F

















Results





- Labs


CBC & Chem 7: 


                                 08/17/20 23:41





                                08/18/20 Unknown


Labs: 


                              Abnormal lab results











  08/17/20 08/17/20 08/18/20 Range/Units





  23:41 23:41 00:58 


 


RBC   3.48 L   (3.65-5.03)  M/mm3


 


Hgb   9.5 L   (11.8-15.2)  gm/dl


 


Hct   29.0 L   (35.5-45.6)  %


 


MCV   83 L   (84-94)  fl


 


MCH   27 L   (28-32)  pg


 


RDW   16.0 H   (13.2-15.2)  %


 


Mono % (Auto)   11.8 H   (0.0-7.3)  %


 


Sodium     (137-145)  mmol/L


 


Potassium     (3.6-5.0)  mmol/L


 


Chloride     ()  mmol/L


 


BUN     (9-20)  mg/dL


 


Creatinine     (0.8-1.3)  mg/dL


 


Glucose     ()  mg/dL


 


POC Glucose  > 500 H    ()  


 


Hemoglobin A1c     (4-6)  %


 


Calcium     (8.4-10.2)  mg/dL


 


Magnesium    1.30 L  (1.7-2.3)  mg/dL


 


Alkaline Phosphatase     ()  units/L


 


Total Protein     (6.3-8.2)  g/dL


 


Albumin     (3.9-5)  g/dL














  08/18/20 08/18/20 08/18/20 Range/Units





  04:27 06:28 08:02 


 


RBC     (3.65-5.03)  M/mm3


 


Hgb     (11.8-15.2)  gm/dl


 


Hct     (35.5-45.6)  %


 


MCV     (84-94)  fl


 


MCH     (28-32)  pg


 


RDW     (13.2-15.2)  %


 


Mono % (Auto)     (0.0-7.3)  %


 


Sodium     (137-145)  mmol/L


 


Potassium    3.0 L  (3.6-5.0)  mmol/L


 


Chloride     ()  mmol/L


 


BUN    3 L  (9-20)  mg/dL


 


Creatinine    0.6 L  (0.8-1.3)  mg/dL


 


Glucose    202 H  ()  mg/dL


 


POC Glucose  430 H  187 H   ()  


 


Hemoglobin A1c     (4-6)  %


 


Calcium    8.0 L  (8.4-10.2)  mg/dL


 


Magnesium     (1.7-2.3)  mg/dL


 


Alkaline Phosphatase    567 H  ()  units/L


 


Total Protein    5.6 L  (6.3-8.2)  g/dL


 


Albumin    2.3 L  (3.9-5)  g/dL














  08/18/20 08/18/20 08/18/20 Range/Units





  08:02 08:29 12:03 


 


RBC     (3.65-5.03)  M/mm3


 


Hgb     (11.8-15.2)  gm/dl


 


Hct     (35.5-45.6)  %


 


MCV     (84-94)  fl


 


MCH     (28-32)  pg


 


RDW     (13.2-15.2)  %


 


Mono % (Auto)     (0.0-7.3)  %


 


Sodium     (137-145)  mmol/L


 


Potassium     (3.6-5.0)  mmol/L


 


Chloride     ()  mmol/L


 


BUN     (9-20)  mg/dL


 


Creatinine     (0.8-1.3)  mg/dL


 


Glucose     ()  mg/dL


 


POC Glucose   202 H  274 H  ()  


 


Hemoglobin A1c  14.0 H    (4-6)  %


 


Calcium     (8.4-10.2)  mg/dL


 


Magnesium     (1.7-2.3)  mg/dL


 


Alkaline Phosphatase     ()  units/L


 


Total Protein     (6.3-8.2)  g/dL


 


Albumin     (3.9-5)  g/dL














  08/18/20 Range/Units





  Unknown 


 


RBC   (3.65-5.03)  M/mm3


 


Hgb   (11.8-15.2)  gm/dl


 


Hct   (35.5-45.6)  %


 


MCV   (84-94)  fl


 


MCH   (28-32)  pg


 


RDW   (13.2-15.2)  %


 


Mono % (Auto)   (0.0-7.3)  %


 


Sodium  126 L  (137-145)  mmol/L


 


Potassium  3.0 L  (3.6-5.0)  mmol/L


 


Chloride  83.7 L  ()  mmol/L


 


BUN  4 L  (9-20)  mg/dL


 


Creatinine   (0.8-1.3)  mg/dL


 


Glucose  671 H*  ()  mg/dL


 


POC Glucose   ()  


 


Hemoglobin A1c   (4-6)  %


 


Calcium   (8.4-10.2)  mg/dL


 


Magnesium   (1.7-2.3)  mg/dL


 


Alkaline Phosphatase   ()  units/L


 


Total Protein   (6.3-8.2)  g/dL


 


Albumin   (3.9-5)  g/dL














Assessment and Plan





A/P:


34-year-old male with perirectal abscess known to us from his previous 

hospitalizations in June and May 2020.  He underwent an I&D and seton drain 

placement by general surgery in June 2020, and another washout during that time 

along with IV abx.  Patient has reportedly been quite noncompliant with regards 

to wound care and sits baths and frequent fecal contamination of his wound:





#Chronic perirectal fistula: Status post I&D and seton drain placement in June 2020.  Patient with noncompliance to wound care, pain medication seeking 

behavior, frequent fecal contamination.  No fever or leukocytosis.  The area 

will remain heavily colonized with enteric bacteria due to its location.  There 

is no role for antibiotics at this time given lack of any significant 

inflammation.  





#DM uncontrolled





Recs:


Continue local wound care


No role for antibiotics at this time





Dulce Srivastava MD, FACP


[facility] ([facility])


C: [phone]


O: [phone]


F: [phone]

## 2020-08-19 VITALS — SYSTOLIC BLOOD PRESSURE: 103 MMHG | DIASTOLIC BLOOD PRESSURE: 66 MMHG

## 2020-08-19 LAB
BASOPHILS # (AUTO): 0 K/MM3 (ref 0–0.1)
BASOPHILS NFR BLD AUTO: 0.6 % (ref 0–1.8)
BUN SERPL-MCNC: 3 MG/DL (ref 9–20)
BUN SERPL-MCNC: 4 MG/DL (ref 9–20)
BUN/CREAT SERPL: 5 %
BUN/CREAT SERPL: 7 %
CALCIUM SERPL-MCNC: 7.9 MG/DL (ref 8.4–10.2)
CALCIUM SERPL-MCNC: 8.1 MG/DL (ref 8.4–10.2)
EOSINOPHIL # BLD AUTO: 0.1 K/MM3 (ref 0–0.4)
EOSINOPHIL NFR BLD AUTO: 1.7 % (ref 0–4.3)
HCT VFR BLD CALC: 27.6 % (ref 35.5–45.6)
HEMOLYSIS INDEX: 1
HEMOLYSIS INDEX: 2
HGB BLD-MCNC: 8.8 GM/DL (ref 11.8–15.2)
LYMPHOCYTES # BLD AUTO: 1.3 K/MM3 (ref 1.2–5.4)
LYMPHOCYTES NFR BLD AUTO: 30.3 % (ref 13.4–35)
MCHC RBC AUTO-ENTMCNC: 32 % (ref 32–34)
MCV RBC AUTO: 83 FL (ref 84–94)
MONOCYTES # (AUTO): 0.4 K/MM3 (ref 0–0.8)
MONOCYTES % (AUTO): 9.1 % (ref 0–7.3)
PLATELET # BLD: 198 K/MM3 (ref 140–440)
RBC # BLD AUTO: 3.3 M/MM3 (ref 3.65–5.03)

## 2020-08-19 RX ADMIN — OXYCODONE AND ACETAMINOPHEN PRN TAB: 5; 325 TABLET ORAL at 04:24

## 2020-08-19 RX ADMIN — Medication SCH ML: at 09:50

## 2020-08-19 RX ADMIN — INSULIN LISPRO SCH UNIT: 100 INJECTION, SOLUTION INTRAVENOUS; SUBCUTANEOUS at 12:32

## 2020-08-19 RX ADMIN — LOPERAMIDE HYDROCHLORIDE SCH MG: 2 CAPSULE ORAL at 09:52

## 2020-08-19 RX ADMIN — MORPHINE SULFATE PRN MG: 2 INJECTION, SOLUTION INTRAMUSCULAR; INTRAVENOUS at 09:49

## 2020-08-19 RX ADMIN — NICOTINE SCH MG: 7 PATCH TRANSDERMAL at 09:50

## 2020-08-19 RX ADMIN — MORPHINE SULFATE PRN MG: 2 INJECTION, SOLUTION INTRAMUSCULAR; INTRAVENOUS at 14:01

## 2020-08-19 RX ADMIN — INSULIN LISPRO SCH: 100 INJECTION, SOLUTION INTRAVENOUS; SUBCUTANEOUS at 08:30

## 2020-08-19 RX ADMIN — TAMSULOSIN HYDROCHLORIDE SCH MG: 0.4 CAPSULE ORAL at 09:49

## 2020-08-19 RX ADMIN — METRONIDAZOLE SCH MLS/HR: 5 INJECTION, SOLUTION INTRAVENOUS at 05:17

## 2020-08-19 NOTE — CONSULTATION
History of Present Illness





- Reason for Consult


Consult date: 08/19/20


Reason for consult: management of meds





- History of Present Psychiatric Illness


Jaxson Kam is a 35y/o male patient who states he came to the hospital because 

he was "bit by a spider and developed a hole." The patient is a/o x 3. He is 

calm and cooperative, but becomes slightly irritable at some of the questions. 

He tells me "I never told anybody I needed someone for psych." He says "I don't 

need your services. I'm good." The patient initially denies any psychiatric 

history in the past, or being on any medications, but states, "maybe in custodial 

they told me I had depression." He denies SI/HI or any past attempts. The 

patient also denies any psychiatric admissions. The patient denies any illicit 

drug use or alcohol. He admits to smoking "4 to 5 cigarets daily." The patient 

says he currently "lives in a [facility] house." He says he was "recently released 

from custodial." Mr. Kam describes his mood as "good." He denies any problems 

with his sleep or appetite. 





PAST PSYCHIATRIC HISTORY:


Diagnoses: Depression


Suicide attempts or Self-harm behavior: Denies


Prior psychiatric hospitalizations: Denies


Substance Abuse history: Nicotine


Previous psychiatric medications tried: Denies


Outpatient treatment: Denies





PAST MEDICAL HISTORY: None reported





Family Psychiatric History: None reported or documented





SOCIAL HISTORY


Marital Status: Single


Living Arrangements: care home house


Employment Status: Unemployed


Access to guns/weapons: Denies


Education: 9th grade


History of Abuse: Denies


Legal History: Released from custodial





REVIEW OF SYSTEMS


Constitutional: Negative for weight loss


ENT: Negative for stridor


Respiratory: Negative for cough or hemoptysis


All other systems reviewed and are negative





MENTAL STATUS EXAMINATION


General Appearance: Dressed appropriately


Behavior: Calm and cooperative


Mood: "good"


Affect and affective range: Congruent


Thought Process: Goal directed


Speech: normal tone and pace


Thought Content:


     Suicidal Ideation: Denies SI


     Homicidal Ideation: Denies


     Hallucinations: Denies


     Delusions: Denies


Insight and Judgment: Limited


Memory/Cognition: Limited


Attention: Normal





ASSESSMENT


Major Depressive Disorder





RECOMMENDATIONS


No scripts given


Sitter: Defer to primary


Medical: per primary


Disposition: Do not Recommend acute inpatient psychiatric treatment. The patient

may discharge home once medically clear.


The  to place resources for outpatient psychiatry


The patient to follow up with outpatient psych or primary in 7 to 14 days upon 

discharge 


Will sign off. Thank you for this consult. Please call with any questions or 

concerns. 





Medications and Allergies


                                    Allergies











Allergy/AdvReac Type Severity Reaction Status Date / Time


 


Sulfa (Sulfonamide Allergy  Rash Verified 07/07/20 09:28





Antibiotics)     











                                Home Medications











 Medication  Instructions  Recorded  Confirmed  Last Taken  Type


 


Balsalazide Disodium [Colazal] 750 mg PO TID 06/01/20 07/02/20 Unknown History


 


Insulin Detemir [Levemir VIAL] 25 unit SQ QHS 06/01/20 07/02/20 1 Day Ago 

History





    ~07/01/20 


 


Insulin NPH/Regular [NovoLIN 70/30] 4 unit SUB-Q BID 06/01/20 07/02/20 Unknown 

History


 


OLANZapine [Zyprexa] 5 mg PO DAILY 06/01/20 07/02/20 Unknown History


 


Potassium Chloride [K-Dur] 10 meq PO BID 06/01/20 07/02/20 Unknown History


 


Tamsulosin [Flomax] 0.4 mg PO QDAY 06/01/20 07/02/20 Unknown History


 


ursodioL [Ursodiol] 300 mg PO BID 06/01/20 07/02/20 Unknown History


 


Acetaminophen [Acetaminophen TAB] 650 mg PO Q4H PRN  tablet 06/05/20 07/02/20 

Unknown Rx


 


Zinc Oxide 1 applic TP BID #7 tube 06/13/20 07/02/20 Unknown Rx


 


Loperamide [Imodium] 2 mg PO TID #30 capsule 07/05/20  Unknown Rx


 


oxyCODONE /ACETAMINOPHEN [Percocet 1 tab PO Q6H PRN #14 tablet 07/05/20  Unknown

 Rx





5/325 mg]     


 


Magnesium Oxide [Mag-Ox] 400 mg PO QDAY #14 tablet 07/15/20  Unknown Rx


 


Potassium Chloride 20 meq PO QDAY #14 packet 07/15/20  Unknown Rx











Active Meds: 


Active Medications





Acetaminophen (Tylenol)  650 mg PO Q4H PRN


   PRN Reason: Pain MILD(1-3)/Fever >100.5/HA


Dextrose (D50w (25gm) Syringe)  50 ml IV Q30MIN PRN; Protocol


   PRN Reason: Hypoglycemia


Enoxaparin Sodium (Enoxaparin)  40 mg SUB-Q QDAY@2200 [facility]


   Last Admin: 08/18/20 21:02 Dose:  40 mg


   Documented by: 


Sodium Chloride (Nacl 0.9% 1000 Ml)  1,000 mls @ 100 mls/hr IV AS DIRECT [facility]


   Last Admin: 08/19/20 09:50 Dose:  100 mls/hr


   Documented by: 


Insulin Glargine (Lantus)  25 units SUB-Q QHS [facility]


   Last Admin: 08/18/20 23:02 Dose:  25 units


   Documented by: 


Insulin Human Lispro (Humalog)  0 unit SUB-Q ACHS [facility]; Protocol


   Last Admin: 08/19/20 12:32 Dose:  4 unit


   Documented by: 


Loperamide HCl (Imodium)  2 mg PO TID [facility]


Miscellaneous Medication (Ursodiol [Ursodiol])  300 mg PO BID [facility]


Miscellaneous Medication (Balsalazide Disodium [Colazal])  750 mg PO TID [facility]


Morphine Sulfate (Morphine)  2 mg IV Q4H PRN


   PRN Reason: Pain, Moderate (4-6)


   Last Admin: 08/19/20 09:49 Dose:  2 mg


   Documented by: 


Nicotine (Habitrol)  7 mg TD QDAY [facility]


   Last Admin: 08/19/20 09:50 Dose:  7 mg


   Documented by: 


Olanzapine (Zyprexa)  5 mg PO DAILY [facility]


   Last Admin: 08/19/20 09:50 Dose:  5 mg


   Documented by: 


Ondansetron HCl (Zofran)  4 mg IV Q8H PRN


   PRN Reason: Nausea And Vomiting


Oxycodone/Acetaminophen (Percocet 5/325)  1 tab PO Q6H PRN


   PRN Reason: Pain, Moderate (4-6)


Potassium Chloride (K-Dur)  10 meq PO BID [facility]


Sodium Chloride (Sodium Chloride Flush Syringe 10 Ml)  10 ml IV BID [facility]


   Last Admin: 08/19/20 09:50 Dose:  10 ml


   Documented by: 


Sodium Chloride (Sodium Chloride Flush Syringe 10 Ml)  10 ml IV PRN PRN


   PRN Reason: LINE FLUSH


Tamsulosin HCl (Flomax)  0.4 mg PO QDAY [facility]


   Last Admin: 08/19/20 09:49 Dose:  0.4 mg


   Documented by: 


Zinc Oxide (Zinc Oxide)  1 applic TP BID [facility]


   Last Admin: 08/19/20 12:33 Dose:  1 applic


   Documented by: 











Mental Status Exam





- Vital signs


                                Last Vital Signs











Temp  98.0 F   08/19/20 05:01


 


Pulse  74   08/19/20 05:01


 


Resp  18   08/19/20 05:01


 


BP  97/66   08/19/20 05:01


 


Pulse Ox  96   08/19/20 05:01














Results


Result Diagrams: 


                                 08/19/20 04:11





                                 08/19/20 04:11


                              Abnormal lab results











  08/18/20 08/19/20 08/19/20 Range/Units





  16:27 04:11 04:11 


 


WBC   4.2 L   (4.5-11.0)  K/mm3


 


RBC   3.30 L   (3.65-5.03)  M/mm3


 


Hgb   8.8 L   (11.8-15.2)  gm/dl


 


Hct   27.6 L   (35.5-45.6)  %


 


MCV   83 L   (84-94)  fl


 


MCH   27 L   (28-32)  pg


 


RDW   16.3 H   (13.2-15.2)  %


 


Mono % (Auto)   9.1 H   (0.0-7.3)  %


 


Potassium    3.1 L  (3.6-5.0)  mmol/L


 


Chloride    107.5 H  ()  mmol/L


 


BUN    4 L  (9-20)  mg/dL


 


Creatinine    0.6 L  (0.8-1.3)  mg/dL


 


Glucose    177 H  ()  mg/dL


 


POC Glucose  273 H    ()  


 


Calcium    8.1 L  (8.4-10.2)  mg/dL


 


Magnesium    1.10 L  (1.7-2.3)  mg/dL














  08/19/20 08/19/20 Range/Units





  08:04 11:32 


 


WBC    (4.5-11.0)  K/mm3


 


RBC    (3.65-5.03)  M/mm3


 


Hgb    (11.8-15.2)  gm/dl


 


Hct    (35.5-45.6)  %


 


MCV    (84-94)  fl


 


MCH    (28-32)  pg


 


RDW    (13.2-15.2)  %


 


Mono % (Auto)    (0.0-7.3)  %


 


Potassium    (3.6-5.0)  mmol/L


 


Chloride    ()  mmol/L


 


BUN    (9-20)  mg/dL


 


Creatinine    (0.8-1.3)  mg/dL


 


Glucose    ()  mg/dL


 


POC Glucose  129 H  225 H  ()  


 


Calcium    (8.4-10.2)  mg/dL


 


Magnesium    (1.7-2.3)  mg/dL








All other labs normal.

## 2020-08-19 NOTE — DISCHARGE SUMMARY
<ALFREDAED OLIVER - Last Filed: 08/19/20 15:10>





Providers





- Providers


Date of Admission: 


08/18/20 02:51





Attending physician: 


ED GANT





                                        





08/18/20 02:40


Consult to Physician [CONS] Urgent 


   Comment: Dr. Gonzalez spoke with Dr. Chavez @ 0231


   Consulting Provider: RHIANNA CHAVEZ


   Physician Instructions: 


   Reason For Exam: perirectal abscess





08/18/20 07:46


Consult to Physician [CONS] Routine 


   Comment: 


   Consulting Provider: RENE THAPA


   Physician Instructions: 


   Reason For Exam: Perirectal Abscess





08/18/20 07:48


Consult to Wound/ET Nurse [CONS] Routine 


   Reason For Exam: wound eval





08/18/20 15:33


Consult to Case Management [CONS] Routine 


   Services Needed at Discharge: Home Health Services


                                   


   Notified:: CM


   Comment:: Current resident of [facility], [place]





08/19/20 08:30


psychiatry consult [Consult to Mental Health] [CONS] Routine 


   Reason For Exam: mgt of psy meds











Primary care physician: 


PRIMARY CARE MD








Hospitalization


Condition: Stable


Hospital course: 





I saw and evaluated the patient. I agree with the findings and the plan of care 

as documented in the Nurse Practitioner's~note, with the following corrections 

and additions.





Disposition: DC-01 TO HOME OR SELFCARE





Exam





- Constitutional


Vitals: 


                                        











Temp Pulse Resp BP Pulse Ox


 


 97.5 F L  78   22   103/66   96 


 


 08/19/20 11:15  08/19/20 11:15  08/19/20 11:15  08/19/20 11:15  08/19/20 11:15














Plan


Follow up with: 


PRIMARY CARE,MD [Primary Care Provider] - 7 Days


RHIANNA CHAVEZ MD [Staff Physician] - 7 Days


Prescriptions: 


Insulin Detemir [Levemir VIAL] 25 unit SQ QHS #1 vial


Tamsulosin [Flomax] 0.4 mg PO QDAY #30 cap


Nicotine [Habitrol] 7 mg TD QDAY #30 patch


Loperamide [Imodium] 2 mg PO TID #90 capsule


Magnesium Oxide [Mag-Ox] 400 mg PO QDAY #14 tablet


Insulin NPH/Regular [NovoLIN 70/30] 4 unit SUB-Q BID #1 vial


OLANzapine [ZyPREXA] 5 mg PO DAILY #30 tablet





<YULISSA SIERRA - Last Filed: 08/19/20 15:55>





Providers





- Providers


Date of Admission: 


08/18/20 02:51





Attending physician: 


ED GANT





                                        





08/18/20 02:40


Consult to Physician [CONS] Urgent 


   Comment: Dr. Gonzalez spoke with Dr. Chavez @ 0231


   Consulting Provider: RHIANNA CHAVEZ


   Physician Instructions: 


   Reason For Exam: perirectal abscess





08/18/20 07:46


Consult to Physician [CONS] Routine 


   Comment: 


   Consulting Provider: RENE THAPA


   Physician Instructions: 


   Reason For Exam: Perirectal Abscess





08/18/20 07:48


Consult to Wound/ET Nurse [CONS] Routine 


   Reason For Exam: wound eval





08/18/20 15:33


Consult to Case Management [CONS] Routine 


   Services Needed at Discharge: Home Health Services


                                   


   Notified:: CM


   Comment:: Current resident of [facility], [place]





08/19/20 08:30


psychiatry consult [Consult to Mental Health] [CONS] Routine 


   Reason For Exam: mgt of [facility] meds











Primary care physician: 


PRIMARY CARE MD








Hospitalization


Hospital course: 





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

 seton placement in July 2020, DM, 1/2 ppd smoker x10 years and ? Depression who

 presents to [facility] with pain and greenish and malodorus drainage from his 

perirectal abscess and fecal incontinence for 3 to 4 days. He was recently 

admitted on 7/16/2020 for rectal abscess and at that time refused a diverting 

colostomy and was discharged with home health care for wound care.  He is a 

resident of a [facility], [place] recently was released from jail. In the emergency 

department he was found with hyperglycemia (),  hypokalemia (3), 

hypomagnesemia (1.8), hyponatremia (126).  In the emergency department he 

received 40meq KCl PO, 10meq KCl IV, 3 L normal saline, 10 units of insulin, 2 g

 of magnesium and Zosyn x1 dose. Dr. Chavez was consulted in ED. Infectious 

disease was consulted and stopped all antibiotics at this time.  Dr. Chavez 

commended daily sitz bath and emphasized the need for keeping his perirectal 

area clean and dry. Psychiatry was consulted and they recommended follow-up with

 outpatient psych services. 








(1) Hyperglycemia


Current Visit: No   Status: Acute   


Plan to address problem: 


- BG on presentation to [facility]


- 8/18 Hemoglobin A1c 14


- Continue to check your blood glucose on regular intervals and resume your home

 insulin regimen





(2) Perirectal abscess


Current Visit: Yes   Status: Acute   


Plan to address problem: 


-Dr. Chavez recommends daily sitz baths and keep the area clean and dry and 

Imodium added in hopes of decreasing liquid stools


-Patient recently admitted July 2020 s/p I&D of left perineal abscess and seton 

 placement; he refused a diverting colostomy and was discharged to a personal 

care home with dressing changes at home.


-8/17 UA (-)





(3) Hypokalemia


Current Visit: Yes   Status: Acute   


Plan to address problem: 


-Will be discharged with p.o. KCl supplementation





(5) IDDM (insulin dependent diabetes mellitus)


Current Visit: No   Status: Chronic   


Plan to address problem: 


- Home medication includes NPH/regular insulin 4 units twice daily and Levemir 

25 units at bedtime


- CC diet 





(6) Tobacco abuse


Current Visit: Yes   Status: Chronic   


Plan to address problem: 


- Smoking cessation counseling


  


Time spent for discharge: 35





Core Measure Documentation





- Palliative Care


Palliative Care/ Comfort Measures: Not Applicable





- Core Measures


Any of the following diagnoses?: none





Exam





- Constitutional


Vitals: 


                                        











Temp Pulse Resp BP Pulse Ox


 


 98.0 F   74   18   97/66   96 


 


 08/19/20 05:01  08/19/20 05:01  08/19/20 05:01  08/19/20 05:01  08/19/20 05:01











General appearance: Present: no acute distress





- EENT


Eyes: Present: PERRL


ENT: hearing intact, poor dentition





- Neck


Neck: Present: normal ROM





- Respiratory


Respiratory effort: normal


Respiratory: bilateral: CTA





- Cardiovascular


Rhythm: regular


Heart Sounds: Present: S1 & S2.  Absent: systolic murmur, diastolic murmur





- Extremities


Extremities: no ischemia, pulses intact, No edema, normal temperature, Full ROM


Peripheral Pulses: within normal limits





- Abdominal


General gastrointestinal: Present: soft, non-tender, normal bowel sounds





- Rectal


Rectal Exam: other (drain in place in perirectal area)





- Integumentary


Integumentary: Present: clear, warm, dry





- Musculoskeletal


Musculoskeletal: strength equal bilaterally





- Psychiatric


Psychiatric: appropriate mood/affect, cooperative





- Neurologic


Neurologic: CNII-XII intact, no focal deficits, moves all extremities





- Allied Health


Allied health notes reviewed: nursing, social work, case management





Plan


Activity: advance as tolerated


Diet: regular


Wound: per your surgeon's advice, drain care as instructed


Special Instructions: smoking cessation


Additional Instructions: Present to the nearest emergency department or your 

primary care provider for experience worsening of your symptoms.  Please follow-

up with your primary care provider within 1 to 2 weeks of discharge.  Follow-up 

with Dr. Melendez in 1 to 2 weeks of discharge.  Please follow-up with outpatient 

psychiatry services.

## 2020-09-02 NOTE — PROGRESS NOTE
Assessment and Plan





- Patient Problems


(1) Fever


Current Visit: Yes   Status: Acute   


Plan to address problem: 


Patient fever most likely secondary to pneumonia versus perirectal abscess.  

Patient had no fever for 4 days prior to being discharged and now developed a 

fever obviously infiltrate on lung chest x-ray.








(2) Left lower lobe pneumonia


Current Visit: Yes   Status: Acute   





(3) Malnutrition


Current Visit: Yes   Status: Acute   


Qualifiers: 


   Protein-calorie malnutrition severity: severe 


Plan to address problem: 


Patient nutritional status has been addressed.








(4) Perirectal abscess


Current Visit: Yes   Status: Acute   


Plan to address problem: 


Perirectal abscess continue meropenem and we will do on outpatient basis.








Subjective


Date of service: 06/07/20


Principal diagnosis: Pneumonia


Interval history: 





Patient 34 years old just here and discharge for perirectal abscess.  Patient 

was doing well has been afebrile greater than 72 hours prior to discharge.  

Patient was discharged on IV antibiotics meropenem and presented a day later 

with a fever of 103 and diagnosed with left lower lobe pneumonia.  Since that 

time patient has been afebrile temp 98.5.  Upon admission patient did not have 

any symptoms of pneumonia such as shortness of breath no cough no congestion no 

dyspnea on exertion no sinus symptoms.  Patient states now he is in pain would 

like something for pain control.





Objective





- Constitutional


Vitals: 


                               Vital Signs - 12hr











  06/06/20 06/06/20 06/06/20





  21:01 21:14 21:21


 


Temperature  99.8 F H 


 


Pulse Rate 83  76


 


Respiratory 15  16





Rate   


 


Respiratory   





Rate [buttock]   


 


Blood Pressure 96/56  92/55


 


Blood Pressure   





[Right]   


 


O2 Sat by Pulse 97  97





Oximetry   














  06/06/20 06/06/20 06/06/20





  21:45 22:00 23:00


 


Temperature 98.2 F  


 


Pulse Rate 66  


 


Respiratory 18  





Rate   


 


Respiratory  18 





Rate [buttock]   


 


Blood Pressure 86/51  


 


Blood Pressure   





[Right]   


 


O2 Sat by Pulse 95  95





Oximetry   














  06/07/20 06/07/20 06/07/20





  01:01 01:15 02:01


 


Temperature  98.2 F 


 


Pulse Rate  66 


 


Respiratory 16 16 17





Rate   


 


Respiratory   





Rate [buttock]   


 


Blood Pressure   


 


Blood Pressure  127/83 





[Right]   


 


O2 Sat by Pulse   





Oximetry   














  06/07/20 06/07/20 06/07/20





  02:24 04:49 05:50


 


Temperature  98.9 F 


 


Pulse Rate  94 H 


 


Respiratory  20 18





Rate   


 


Respiratory   





Rate [buttock]   


 


Blood Pressure 94/61 93/66 


 


Blood Pressure   





[Right]   


 


O2 Sat by Pulse  95 





Oximetry   











General appearance: Present: no acute distress, mild distress





- EENT


Eyes: PERRL, EOM intact


ENT: hearing intact, clear oral mucosa





- Neck


Neck: supple





- Respiratory


Respiratory: bilateral: CTA





- Cardiovascular


Rhythm: regular


Heart Sounds: Present: S1 & S2.  Absent: gallop, rub


Extremities: pulses intact, No edema, normal color, Full ROM





- Genitourinary


Male genitourinary: normal





- Integumentary


Integumentary: clear, warm, dry





- Musculoskeletal


Musculoskeletal: strength equal bilaterally, generalized weakness





- Neurologic


Neurologic: moves all extremities





- Psychiatric


Psychiatric: appropriate mood/affect, intact judgment & insight, memory intact, 

other (Noticeable cognitive complications.)





- Labs


CBC & Chem 7: 


                                 06/07/20 05:58





                                 06/07/20 05:58


Labs: 


                              Abnormal lab results











  06/06/20 06/06/20 06/06/20 Range/Units





  17:31 17:58 17:58 


 


RBC   3.16 L   (3.65-5.03)  M/mm3


 


Hgb   9.1 L   (11.8-15.2)  gm/dl


 


Hct   28.0 L   (35.5-45.6)  %


 


RDW   17.7 H   (13.2-15.2)  %


 


Mono % (Auto)     (0.0-7.3)  %


 


Monocytes % (Manual)   11.0 H   (0.0-7.3)  %


 


Lymphocytes # (Manual)   1.0 L   (1.2-5.4)  K/mm3


 


D-Dimer     (0-234)  ng/mlDDU


 


BUN    6 L  (9-20)  mg/dL


 


Creatinine    0.6 L  (0.8-1.5)  mg/dL


 


Glucose    156 H  ()  mg/dL


 


POC Glucose     ()  


 


Calcium    7.9 L  (8.4-10.2)  mg/dL


 


Alkaline Phosphatase    323 H  ()  units/L


 


C-Reactive Protein     (0.00-1.30)  mg/dL


 


Total Protein    5.9 L  (6.3-8.2)  g/dL


 


Albumin    2.1 L  (3.9-5)  g/dL


 


Urine pH  8.0 H    (5.0-7.0)  














  06/06/20 06/06/20 06/07/20 Range/Units





  18:58 18:58 00:15 


 


RBC     (3.65-5.03)  M/mm3


 


Hgb     (11.8-15.2)  gm/dl


 


Hct     (35.5-45.6)  %


 


RDW     (13.2-15.2)  %


 


Mono % (Auto)     (0.0-7.3)  %


 


Monocytes % (Manual)     (0.0-7.3)  %


 


Lymphocytes # (Manual)     (1.2-5.4)  K/mm3


 


D-Dimer  597.75 H    (0-234)  ng/mlDDU


 


BUN     (9-20)  mg/dL


 


Creatinine     (0.8-1.5)  mg/dL


 


Glucose   134 H   ()  mg/dL


 


POC Glucose    123 H  ()  


 


Calcium     (8.4-10.2)  mg/dL


 


Alkaline Phosphatase     ()  units/L


 


C-Reactive Protein   7.20 H   (0.00-1.30)  mg/dL


 


Total Protein     (6.3-8.2)  g/dL


 


Albumin     (3.9-5)  g/dL


 


Urine pH     (5.0-7.0)  














  06/07/20 06/07/20 06/07/20 Range/Units





  05:58 05:58 08:02 


 


RBC  3.28 L    (3.65-5.03)  M/mm3


 


Hgb  9.4 L    (11.8-15.2)  gm/dl


 


Hct  29.2 L    (35.5-45.6)  %


 


RDW  18.5 H    (13.2-15.2)  %


 


Mono % (Auto)  15.7 H    (0.0-7.3)  %


 


Monocytes % (Manual)     (0.0-7.3)  %


 


Lymphocytes # (Manual)     (1.2-5.4)  K/mm3


 


D-Dimer     (0-234)  ng/mlDDU


 


BUN   5 L   (9-20)  mg/dL


 


Creatinine   0.7 L   (0.8-1.5)  mg/dL


 


Glucose   131 H   ()  mg/dL


 


POC Glucose    125 H  ()  


 


Calcium   8.1 L   (8.4-10.2)  mg/dL


 


Alkaline Phosphatase   306 H   ()  units/L


 


C-Reactive Protein     (0.00-1.30)  mg/dL


 


Total Protein   5.9 L   (6.3-8.2)  g/dL


 


Albumin   2.3 L   (3.9-5)  g/dL


 


Urine pH     (5.0-7.0) IV discontinued, cath removed intact

## 2020-11-19 ENCOUNTER — OFFICE VISIT (OUTPATIENT)
Dept: URBAN - METROPOLITAN AREA CLINIC 94 | Facility: CLINIC | Age: 35
End: 2020-11-19

## 2021-01-26 ENCOUNTER — HOSPITAL ENCOUNTER (EMERGENCY)
Dept: HOSPITAL 5 - ED | Age: 36
Discharge: LEFT BEFORE DISCHARGE | End: 2021-01-26
Payer: MEDICAID

## 2021-01-26 VITALS — DIASTOLIC BLOOD PRESSURE: 66 MMHG | SYSTOLIC BLOOD PRESSURE: 106 MMHG

## 2021-01-26 DIAGNOSIS — Z53.21: ICD-10-CM

## 2021-01-26 DIAGNOSIS — E11.65: Primary | ICD-10-CM

## 2021-09-29 ENCOUNTER — OFFICE VISIT (OUTPATIENT)
Dept: URBAN - METROPOLITAN AREA CLINIC 94 | Facility: CLINIC | Age: 36
End: 2021-09-29

## 2021-09-30 ENCOUNTER — OFFICE VISIT (OUTPATIENT)
Dept: URBAN - METROPOLITAN AREA CLINIC 109 | Facility: CLINIC | Age: 36
End: 2021-09-30
Payer: COMMERCIAL

## 2021-09-30 ENCOUNTER — DASHBOARD ENCOUNTERS (OUTPATIENT)
Age: 36
End: 2021-09-30

## 2021-09-30 DIAGNOSIS — E11.43 TYPE 2 DIABETES MELLITUS WITH DIABETIC AUTONOMIC (POLY)NEUROPATHY: ICD-10-CM

## 2021-09-30 DIAGNOSIS — K50.118 CROHN'S DISEASE OF COLON WITH OTHER COMPLICATION: ICD-10-CM

## 2021-09-30 DIAGNOSIS — Z79.4 LONG TERM (CURRENT) USE OF INSULIN: ICD-10-CM

## 2021-09-30 DIAGNOSIS — F20.9 SCHIZOPHRENIA, UNSPECIFIED TYPE: ICD-10-CM

## 2021-09-30 PROBLEM — 786878009 ANAL FISTULA: Status: ACTIVE | Noted: 2021-09-30

## 2021-09-30 PROBLEM — 73211009 DM - DIABETES MELLITUS: Status: ACTIVE | Noted: 2021-09-30

## 2021-09-30 PROBLEM — 231504006 MIXED ANXIETY AND DEPRESSIVE DISORDER: Status: ACTIVE | Noted: 2021-09-30

## 2021-09-30 PROBLEM — 44054006: Status: ACTIVE | Noted: 2021-09-30

## 2021-09-30 PROBLEM — 50440006: Status: ACTIVE | Noted: 2021-09-30

## 2021-09-30 PROBLEM — 58214004: Status: ACTIVE | Noted: 2021-09-30

## 2021-09-30 PROBLEM — 64766004 ULCERATIVE COLITIS: Status: ACTIVE | Noted: 2021-09-30

## 2021-09-30 PROBLEM — 58214004 SCHIZOPHRENIA: Status: ACTIVE | Noted: 2021-09-30

## 2021-09-30 PROBLEM — 710815001: Status: ACTIVE | Noted: 2021-09-30

## 2021-09-30 PROBLEM — 82423001 CHRONIC PAIN: Status: ACTIVE | Noted: 2021-09-30

## 2021-09-30 PROCEDURE — 99215 OFFICE O/P EST HI 40 MIN: CPT | Performed by: INTERNAL MEDICINE

## 2021-09-30 RX ORDER — OMEPRAZOLE 20 MG/1
1 CAPSULE 30 MINUTES BEFORE MORNING MEAL CAPSULE, DELAYED RELEASE ORAL ONCE A DAY
Status: ACTIVE | COMMUNITY

## 2021-09-30 RX ORDER — INFLIXIMAB 100 MG/10ML
AS DIRECTED INJECTION, POWDER, LYOPHILIZED, FOR SOLUTION INTRAVENOUS
Status: ACTIVE | COMMUNITY

## 2021-09-30 RX ORDER — OXYCODONE HYDROCHLORIDE AND ACETAMINOPHEN 7.5; 325 MG/1; MG/1
1 TABLET AS NEEDED TABLET ORAL
Status: ACTIVE | COMMUNITY

## 2021-09-30 RX ORDER — HUMAN INSULIN 100 [IU]/ML
AS DIRECTED INJECTION, SUSPENSION SUBCUTANEOUS
Status: ACTIVE | COMMUNITY

## 2021-09-30 RX ORDER — HUMAN INSULIN 100 [IU]/ML
AS DIRECTED INJECTION, SOLUTION SUBCUTANEOUS
Status: ACTIVE | COMMUNITY

## 2021-09-30 RX ORDER — CLONAZEPAM 0.5 MG/1
1 TABLET AT BEDTIME TABLET ORAL ONCE A DAY
Status: ACTIVE | COMMUNITY

## 2021-09-30 RX ORDER — VITAMIN A 2400 MCG
1 TABLET CAPSULE ORAL ONCE A DAY
Status: ACTIVE | COMMUNITY

## 2021-09-30 RX ORDER — OLANZAPINE 5 MG/1
1 TABLET TABLET, FILM COATED ORAL ONCE A DAY
Status: ACTIVE | COMMUNITY

## 2021-09-30 RX ORDER — FAMOTIDINE 20 MG/1
1 TABLET AT BEDTIME AS NEEDED TABLET, FILM COATED ORAL ONCE A DAY
Status: ACTIVE | COMMUNITY

## 2021-09-30 RX ORDER — ATORVASTATIN CALCIUM 40 MG/1
1 TABLET TABLET, FILM COATED ORAL ONCE A DAY
Status: ACTIVE | COMMUNITY

## 2021-09-30 NOTE — HPI-TODAY'S VISIT:
The patient returns for f/u care of inflammatory bowel disease.  He was last seen in our practice in 2019 with severe colitis.  Colonoscopy by my colleague, Dr. Levin revealed large areas of denuded mucosa with deep serpiginous ulcers more typical of Crohn's than UC.  He had probable anal involvement then. The patient was to be on infliximab, but this never happened. He has had subsequent surgery to place "drain tubes in the anus" to correct the fistula done about a year ago at [facility]. The patient has schizophrenia and is accompanied by his caregiver, Rev. Cleary who has had him 1-2 months.  Patient was in several other institutions since last seen.  He reports being on his own since age 15 when his mother diet.  He was incarcerated for awhile and homeless for periods. He has some diarrhea and bleeding currently.  Most times his stools are formed.  He some perianal pain and drainage and he passes feces through the fistula. Patient has lost a lot of weight in the past year.  No FH is unknown to patient.  PMH notable for DM, HLD and schizophrenia. He has a diabetic neuropathy.  He stated that he has had no diabetic care and is not taking the insulin that was prescribed.  He will be seeing a PCP today for the first time in [facility].

## 2021-09-30 NOTE — PHYSICAL EXAM GASTROINTESTINAL
Abdomen , soft, nontender, nondistended , no guarding or rigidity , no masses palpable , normal bowel sounds , Liver and Spleen , no hepatomegaly present , no hepatosplenomegaly , liver nontender , spleen not palpable Rectal:  fistulous opening about 2 cm to the right of the anus.

## 2021-09-30 NOTE — PHYSICAL EXAM CONSTITUTIONAL:
well developed, thin, well nourished , in no acute distress , ambulating with difficulty , normal communication ability;  Multiple tattoos

## 2021-10-28 ENCOUNTER — OFFICE VISIT (OUTPATIENT)
Dept: URBAN - METROPOLITAN AREA CLINIC 109 | Facility: CLINIC | Age: 36
End: 2021-10-28

## 2021-11-03 ENCOUNTER — TELEPHONE ENCOUNTER (OUTPATIENT)
Dept: URBAN - METROPOLITAN AREA CLINIC 94 | Facility: CLINIC | Age: 36
End: 2021-11-03

## 2021-11-11 ENCOUNTER — OFFICE VISIT (OUTPATIENT)
Dept: URBAN - METROPOLITAN AREA CLINIC 109 | Facility: CLINIC | Age: 36
End: 2021-11-11

## 2021-12-09 ENCOUNTER — OFFICE VISIT (OUTPATIENT)
Dept: URBAN - METROPOLITAN AREA CLINIC 109 | Facility: CLINIC | Age: 36
End: 2021-12-09

## 2022-01-31 ENCOUNTER — OFFICE VISIT (OUTPATIENT)
Dept: URBAN - METROPOLITAN AREA CLINIC 109 | Facility: CLINIC | Age: 37
End: 2022-01-31

## 2022-12-31 NOTE — EMERGENCY DEPARTMENT REPORT
- General


Chief complaint: Skin/Abscess/Foreign Body


Stated complaint: ABSCESS


Time Seen by Provider: 20 00:57


Source: EMS, old records reviewed


Mode of arrival: Ambulatory


Limitations: No Limitations





- History of Present Illness


Initial comments: 








34-year-old male with a history of complicated perirectal fistula, status post 

incision and drainage, and seton placement, who also has a history of diabetes, 

may have a history of cognitive and/or developmental delay presents to the 

hospital complaining of worsening perirectal pain and drainage x3 days.  Patient

states that the drainage is now green and malodorous.  Continues to have fecal 

incontinence.  He states he has been trying his best to take sitz bath and keep 

the area clean.   Patient denies fever.  Patient also states that he said that 

someone was supposed to come out and help him take care of his wounds but they 

have not.  He is not currently on antibiotics.





As per Dr. Alcantar's note from last ER visit July 15


He was admitted to this hospital in the beginning of the July for rectal 

fistula, it was evaluated by our general surgeon, Dr. Chavez, who is of the 

opinion that the wounds do not appear to be decompensated, and it was felt that 

the main problem was that the patient is not caring for his wounds.  He was 

offered a diverting colostomy which he declined.  The patient "went back and 

forth and ultimately said he did not want to proceed."  The patients case was 

discussed with his , Ms. Carmela Lambetr, [phone] at great length, 

and it is indicated that the aforementioned  would talk to her team 

and then get back to the general surgeon on the results of the discussion.  In 

addition, the aforementioned general surgeon initiated Imodium "to see if I can 

slow down the liquid stool."  Routine wound care was managed recommended at that

time.








Patient presents today with hyperglycemia and is prescribed twice daily insulin 

dosing.  Patient states he did not take any of his insulin today.  He denies 

nausea, vomiting, or abdominal pain.  Patient also has presented here in the 

past with hypokalemia and hypomagnesemia.





as per case management note


20 10:28 - Case Management Note by NATE BE


   Acct Num: [id number]  : 1985  Patient Age: 34





DCP received a call from Helga Disla of [facility] [phone]

2,informed DCP that pt caregiver Ms. Watkins at [facility] has been taught to do

dressing changes for pt. 





Initialized on 20 10:28 - END OF NOTE








so it appears that pt was suppose to hae someone come to help care for the pt's 

wound as per medical record





pt is still not open to receiving a diverting colostomy as recently recommended 

by general surgeon Dr. Chavez





- Related Data


                                Home Medications











 Medication  Instructions  Recorded  Confirmed  Last Taken


 


Balsalazide Disodium [Colazal] 750 mg PO TID 20 Unknown


 


Insulin Detemir [Levemir VIAL] 25 unit SQ QHS 20 1 Day Ago





    ~20


 


Insulin NPH/Regular [NovoLIN 70/30] 4 unit SUB-Q BID 20 Unknown


 


OLANZapine [Zyprexa] 5 mg PO DAILY 20 Unknown


 


Potassium Chloride [K-Dur] 10 meq PO BID 20 Unknown


 


Tamsulosin [Flomax] 0.4 mg PO QDAY 20 Unknown


 


ursodioL [Ursodiol] 300 mg PO BID 20 Unknown








                                  Previous Rx's











 Medication  Instructions  Recorded  Last Taken  Type


 


Acetaminophen [Acetaminophen TAB] 650 mg PO Q4H PRN  tablet 20 Unknown Rx


 


Zinc Oxide 1 applic TP BID #7 tube 20 Unknown Rx


 


Loperamide [Imodium] 2 mg PO TID #30 capsule 20 Unknown Rx


 


oxyCODONE /ACETAMINOPHEN [Percocet 1 tab PO Q6H PRN #14 tablet 20 Unknown 

Rx





5/325 mg]    


 


Magnesium Oxide [Mag-Ox] 400 mg PO QDAY #14 tablet 07/15/20 Unknown Rx


 


Potassium Chloride 20 meq PO QDAY #14 packet 07/15/20 Unknown Rx











                                    Allergies











Allergy/AdvReac Type Severity Reaction Status Date / Time


 


Sulfa (Sulfonamide Allergy  Rash Verified 20 09:28





Antibiotics)     














Abscess Boil HPI





- HPI


Chief Complaint: Skin/Abscess/Foreign Body


Stated Complaint: ABSCESS


Time Seen by Provider: 20 00:57


Home Medications: 


                                Home Medications











 Medication  Instructions  Recorded  Confirmed  Last Taken


 


Balsalazide Disodium [Colazal] 750 mg PO TID 20 Unknown


 


Insulin Detemir [Levemir VIAL] 25 unit SQ QHS 20 1 Day Ago





    ~20


 


Insulin NPH/Regular [NovoLIN 70/30] 4 unit SUB-Q BID 20 Unknown


 


OLANZapine [Zyprexa] 5 mg PO DAILY 20 Unknown


 


Potassium Chloride [K-Dur] 10 meq PO BID 20 Unknown


 


Tamsulosin [Flomax] 0.4 mg PO QDAY 20 Unknown


 


ursodioL [Ursodiol] 300 mg PO BID 20 Unknown








                                  Previous Rx's











 Medication  Instructions  Recorded  Last Taken  Type


 


Acetaminophen [Acetaminophen TAB] 650 mg PO Q4H PRN  tablet 20 Unknown Rx


 


Zinc Oxide 1 applic TP BID #7 tube 20 Unknown Rx


 


Loperamide [Imodium] 2 mg PO TID #30 capsule 20 Unknown Rx


 


oxyCODONE /ACETAMINOPHEN [Percocet 1 tab PO Q6H PRN #14 tablet 20 Unknown 

Rx





5/325 mg]    


 


Magnesium Oxide [Mag-Ox] 400 mg PO QDAY #14 tablet 07/15/20 Unknown Rx


 


Potassium Chloride 20 meq PO QDAY #14 packet 07/15/20 Unknown Rx











Allergies/Adverse Reactions: 


                                    Allergies











Allergy/AdvReac Type Severity Reaction Status Date / Time


 


Sulfa (Sulfonamide Allergy  Rash Verified 20 09:28





Antibiotics)     














ED Review of Systems


ROS: 


Stated complaint: ABSCESS


Other details as noted in HPI





Comment: All other systems reviewed and negative





ED Past Medical Hx





- Past Medical History


Hx Hypertension: No


Hx Heart Attack/AMI: No


Hx Congestive Heart Failure: No


Hx Diabetes: Yes


Hx Deep Vein Thrombosis: No


Hx Pulmonary Embolism: No


Hx Liver Disease: No


Hx Renal Disease: No


Hx Sickle Cell Disease: No


Hx Arthritis: No


Hx Seizures: No


Hx Asthma: No


Hx COPD: No


Hx HIV: No


Additional medical history: Left perianal abscess





- Surgical History


Hx Pacemaker: No


Hx Internal Defibrillator: No


Hx Cholecystectomy: No


Hx Appendectomy: No


Additional Surgical History: Incision and drainage perianal abscess





- Social History


Smoking Status: Never Smoker


Substance Use Type: None





- Medications


Home Medications: 


                                Home Medications











 Medication  Instructions  Recorded  Confirmed  Last Taken  Type


 


Balsalazide Disodium [Colazal] 750 mg PO TID 20 Unknown History


 


Insulin Detemir [Levemir VIAL] 25 unit SQ QHS 20 1 Day Ago 

History





    ~20 


 


Insulin NPH/Regular [NovoLIN 70/30] 4 unit SUB-Q BID 20 Unknown 

History


 


OLANZapine [Zyprexa] 5 mg PO DAILY 20 Unknown History


 


Potassium Chloride [K-Dur] 10 meq PO BID 20 Unknown History


 


Tamsulosin [Flomax] 0.4 mg PO QDAY 20 Unknown History


 


ursodioL [Ursodiol] 300 mg PO BID 20 Unknown History


 


Acetaminophen [Acetaminophen TAB] 650 mg PO Q4H PRN  tablet 20 

Unknown Rx


 


Zinc Oxide 1 applic TP BID #7 tube 20 Unknown Rx


 


Loperamide [Imodium] 2 mg PO TID #30 capsule 20  Unknown Rx


 


oxyCODONE /ACETAMINOPHEN [Percocet 1 tab PO Q6H PRN #14 tablet 20  Unknown

 Rx





5/325 mg]     


 


Magnesium Oxide [Mag-Ox] 400 mg PO QDAY #14 tablet 07/15/20  Unknown Rx


 


Potassium Chloride 20 meq PO QDAY #14 packet 07/15/20  Unknown Rx














ED Physical Exam





- General


Limitations: No Limitations





- Other


Other exam information: 





General: No acute distress


Head: Atraumatic


Eyes: normal appearance


ENT: Moist mucous membranes


Neck: Normal appearance, no midline tenderness


Chest: Clear to auscultation bilaterally


CV: Regular rate and rhythm


Abdomen: Soft, normal bowel sounds, nontender, nondistended, no rebound or 

guarding


Rectal: setonin place, mild drainage around tube, tenderness to perineal area


Back: Normal inspection


Extremity: Normal inspection, full range of motion


Neuro: Alert, no facial asymmetry, speech clear, no gross motor sensory deficit


Psych: Appropriate behavior


Skin: No rash





ED Course


                                   Vital Signs











  20





  22:50 01:13 03:00


 


Temperature 98.8 F 98.1 F 


 


Pulse Rate 112 H 86 


 


Respiratory 16 16 





Rate   


 


Blood Pressure 103/74  98/61


 


Blood Pressure  99/57 





[Left]   


 


O2 Sat by Pulse 97 100 98





Oximetry   














  20





  03:04 03:16 03:30


 


Temperature   


 


Pulse Rate   


 


Respiratory 16  





Rate   


 


Blood Pressure  98/61 93/53


 


Blood Pressure   





[Left]   


 


O2 Sat by Pulse  98 99





Oximetry   














  20





  03:46 04:00


 


Temperature  


 


Pulse Rate  


 


Respiratory  





Rate  


 


Blood Pressure 93/53 95/51


 


Blood Pressure  





[Left]  


 


O2 Sat by Pulse 98 97





Oximetry  














- Reevaluation(s)


Reevaluation #1: 





20 04:40


Patient received IV magnesium, p.o. KCl 40 mEq and IV, KCl 10 mEq as well as 2 L

 of normal saline and glucose decreased to the 400s.  10 of insulin ordered at 

this time with additional ivf








- Consultations


Consultation #1: 





20 02:40


Zahra informed inpatient consult ordered





ED Medical Decision Making





- Lab Data


Result diagrams: 


                                 20 23:41





                                20 Unknown








                                   Lab Results











  20 Range/Units





  23:41 23:41 00:58 


 


WBC   5.9   (4.5-11.0)  K/mm3


 


RBC   3.48 L   (3.65-5.03)  M/mm3


 


Hgb   9.5 L   (11.8-15.2)  gm/dl


 


Hct   29.0 L   (35.5-45.6)  %


 


MCV   83 L   (84-94)  fl


 


MCH   27 L   (28-32)  pg


 


MCHC   33   (32-34)  %


 


RDW   16.0 H   (13.2-15.2)  %


 


Plt Count   213   (140-440)  K/mm3


 


Lymph % (Auto)   26.1   (13.4-35.0)  %


 


Mono % (Auto)   11.8 H   (0.0-7.3)  %


 


Eos % (Auto)   0.7   (0.0-4.3)  %


 


Baso % (Auto)   0.2   (0.0-1.8)  %


 


Lymph #   1.5   (1.2-5.4)  K/mm3


 


Mono #   0.7   (0.0-0.8)  K/mm3


 


Eos #   0.0   (0.0-0.4)  K/mm3


 


Baso #   0.0   (0.0-0.1)  K/mm3


 


Seg Neutrophils %   61.2   (40.0-70.0)  %


 


Seg Neutrophils #   3.6   (1.8-7.7)  K/mm3


 


Sodium     (137-145)  mmol/L


 


Potassium     (3.6-5.0)  mmol/L


 


Chloride     ()  mmol/L


 


Carbon Dioxide     (22-30)  mmol/L


 


Anion Gap     mmol/L


 


BUN     (9-20)  mg/dL


 


Creatinine     (0.8-1.3)  mg/dL


 


Estimated GFR     ml/min


 


BUN/Creatinine Ratio     %


 


Glucose     ()  mg/dL


 


POC Glucose  > 500 H    ()  


 


Calcium     (8.4-10.2)  mg/dL


 


Magnesium    1.30 L  (1.7-2.3)  mg/dL


 


Urine Color     (Yellow)  


 


Urine Turbidity     (Clear)  


 


Urine pH     (5.0-7.0)  


 


Ur Specific Gravity     (1.003-1.030)  


 


Urine Protein     (Negative)  mg/dL


 


Urine Glucose (UA)     (Negative)  mg/dL


 


Urine Ketones     (Negative)  mg/dL


 


Urine Blood     (Negative)  


 


Urine Nitrite     (Negative)  


 


Urine Bilirubin     (Negative)  


 


Urine Urobilinogen     (<2.0)  mg/dL


 


Ur Leukocyte Esterase     (Negative)  


 


Urine WBC (Auto)     (0.0-6.0)  /HPF


 


Urine RBC (Auto)     (0.0-6.0)  /HPF


 


U Epithel Cells (Auto)     (0-13.0)  /HPF


 


Urine Mucus     /HPF














  20 Range/Units





  01:13 Unknown 


 


WBC    (4.5-11.0)  K/mm3


 


RBC    (3.65-5.03)  M/mm3


 


Hgb    (11.8-15.2)  gm/dl


 


Hct    (35.5-45.6)  %


 


MCV    (84-94)  fl


 


MCH    (28-32)  pg


 


MCHC    (32-34)  %


 


RDW    (13.2-15.2)  %


 


Plt Count    (140-440)  K/mm3


 


Lymph % (Auto)    (13.4-35.0)  %


 


Mono % (Auto)    (0.0-7.3)  %


 


Eos % (Auto)    (0.0-4.3)  %


 


Baso % (Auto)    (0.0-1.8)  %


 


Lymph #    (1.2-5.4)  K/mm3


 


Mono #    (0.0-0.8)  K/mm3


 


Eos #    (0.0-0.4)  K/mm3


 


Baso #    (0.0-0.1)  K/mm3


 


Seg Neutrophils %    (40.0-70.0)  %


 


Seg Neutrophils #    (1.8-7.7)  K/mm3


 


Sodium   126 L  (137-145)  mmol/L


 


Potassium   3.0 L  (3.6-5.0)  mmol/L


 


Chloride   83.7 L  ()  mmol/L


 


Carbon Dioxide   28  (22-30)  mmol/L


 


Anion Gap   17  mmol/L


 


BUN   4 L  (9-20)  mg/dL


 


Creatinine   0.8  (0.8-1.3)  mg/dL


 


Estimated GFR   > 60  ml/min


 


BUN/Creatinine Ratio   5  %


 


Glucose   671 H*  ()  mg/dL


 


POC Glucose    ()  


 


Calcium   8.8  (8.4-10.2)  mg/dL


 


Magnesium    (1.7-2.3)  mg/dL


 


Urine Color  Colorless   (Yellow)  


 


Urine Turbidity  Clear   (Clear)  


 


Urine pH  6.0   (5.0-7.0)  


 


Ur Specific Gravity  1.024   (1.003-1.030)  


 


Urine Protein  <15 mg/dl   (Negative)  mg/dL


 


Urine Glucose (UA)  >=500   (Negative)  mg/dL


 


Urine Ketones  Neg   (Negative)  mg/dL


 


Urine Blood  Neg   (Negative)  


 


Urine Nitrite  Neg   (Negative)  


 


Urine Bilirubin  Neg   (Negative)  


 


Urine Urobilinogen  < 2.0   (<2.0)  mg/dL


 


Ur Leukocyte Esterase  Neg   (Negative)  


 


Urine WBC (Auto)  < 1.0   (0.0-6.0)  /HPF


 


Urine RBC (Auto)  < 1.0   (0.0-6.0)  /HPF


 


U Epithel Cells (Auto)  < 1.0   (0-13.0)  /HPF


 


Urine Mucus  Few   /HPF














- Medical Decision Making





Patient has hyperglycemia without DKA however, patient has significant 

hypokalemia and hypomagnesemia.  Patient high risk for further decrease in 

potassium levels with insulin dosage.  Patient initially managed with normal 

saline, p.o. and IV potassium, and IV magnesium.  Glucose will be rechecked 

after administration of IV fluids and meds and insulin can be provided at that 

time based on sliding scale regimen.  As expected patient's perirectal 

complaints are chronic as per medical record review.  He complains of increased 

drainage but he does not have leukocytosis or fever.  I have ordered Zosyn 

antibiotic and consulted general surgeon to evaluate patient during admission.  

Patient may also need repeat case management evaluation since he states that no 

one has shown up to help with his wound care.


Critical Care Time: No


Critical care attestation.: 


If time is entered above; I have spent that time in minutes in the direct care 

of this critically ill patient, excluding procedure time.








ED Disposition


Clinical Impression: 


 Perirectal abscess, Hyperglycemia due to diabetes mellitus, Noncompliance with 

medication regimen, Hypomagnesemia, Hypokalemia





Disposition:  OP ADMIT IP TO THIS HOSP


Is pt being admited?: Yes


Condition: Stable


Time of Disposition: 02:50 ,